# Patient Record
Sex: FEMALE | Race: WHITE | Employment: UNEMPLOYED | URBAN - METROPOLITAN AREA
[De-identification: names, ages, dates, MRNs, and addresses within clinical notes are randomized per-mention and may not be internally consistent; named-entity substitution may affect disease eponyms.]

---

## 2018-03-01 PROBLEM — F32.A MILD DEPRESSION: Status: ACTIVE | Noted: 2018-03-01

## 2018-03-01 PROBLEM — E66.01 OBESITY, MORBID (HCC): Status: ACTIVE | Noted: 2018-03-01

## 2018-03-13 ENCOUNTER — HOSPITAL ENCOUNTER (OUTPATIENT)
Dept: MAMMOGRAPHY | Age: 53
Discharge: HOME | End: 2018-03-13
Attending: FAMILY MEDICINE
Payer: COMMERCIAL

## 2018-03-13 DIAGNOSIS — Z12.39 BREAST SCREENING: ICD-10-CM

## 2018-03-13 PROCEDURE — 77067 SCR MAMMO BI INCL CAD: CPT

## 2018-04-05 PROBLEM — Z12.11 ENCOUNTER FOR SCREENING COLONOSCOPY: Status: ACTIVE | Noted: 2018-04-05

## 2018-04-29 ENCOUNTER — ANESTHESIA EVENT (OUTPATIENT)
Dept: ENDOSCOPY | Age: 53
End: 2018-04-29
Payer: COMMERCIAL

## 2018-04-29 RX ORDER — SODIUM CHLORIDE, SODIUM LACTATE, POTASSIUM CHLORIDE, CALCIUM CHLORIDE 600; 310; 30; 20 MG/100ML; MG/100ML; MG/100ML; MG/100ML
100 INJECTION, SOLUTION INTRAVENOUS CONTINUOUS
Status: CANCELLED | OUTPATIENT
Start: 2018-04-29

## 2018-04-29 RX ORDER — SODIUM CHLORIDE 0.9 % (FLUSH) 0.9 %
5-10 SYRINGE (ML) INJECTION AS NEEDED
Status: CANCELLED | OUTPATIENT
Start: 2018-04-29

## 2018-04-30 ENCOUNTER — HOSPITAL ENCOUNTER (OUTPATIENT)
Age: 53
Setting detail: OUTPATIENT SURGERY
Discharge: HOME | End: 2018-04-30
Attending: SURGERY | Admitting: SURGERY
Payer: COMMERCIAL

## 2018-04-30 ENCOUNTER — ANESTHESIA (OUTPATIENT)
Dept: ENDOSCOPY | Age: 53
End: 2018-04-30
Payer: COMMERCIAL

## 2018-04-30 VITALS
HEIGHT: 67 IN | HEART RATE: 57 BPM | SYSTOLIC BLOOD PRESSURE: 178 MMHG | BODY MASS INDEX: 39.24 KG/M2 | WEIGHT: 250 LBS | TEMPERATURE: 98.1 F | RESPIRATION RATE: 14 BRPM | DIASTOLIC BLOOD PRESSURE: 84 MMHG | OXYGEN SATURATION: 95 %

## 2018-04-30 PROCEDURE — 74011000250 HC RX REV CODE- 250

## 2018-04-30 PROCEDURE — 76060000032 HC ANESTHESIA 0.5 TO 1 HR: Performed by: SURGERY

## 2018-04-30 PROCEDURE — 74011250637 HC RX REV CODE- 250/637: Performed by: SURGERY

## 2018-04-30 PROCEDURE — 74011250636 HC RX REV CODE- 250/636

## 2018-04-30 PROCEDURE — 76040000025: Performed by: SURGERY

## 2018-04-30 PROCEDURE — 74011250636 HC RX REV CODE- 250/636: Performed by: ANESTHESIOLOGY

## 2018-04-30 PROCEDURE — 74011250636 HC RX REV CODE- 250/636: Performed by: SURGERY

## 2018-04-30 RX ORDER — LIDOCAINE HYDROCHLORIDE 20 MG/ML
INJECTION, SOLUTION EPIDURAL; INFILTRATION; INTRACAUDAL; PERINEURAL AS NEEDED
Status: DISCONTINUED | OUTPATIENT
Start: 2018-04-30 | End: 2018-04-30 | Stop reason: HOSPADM

## 2018-04-30 RX ORDER — MIDAZOLAM HYDROCHLORIDE 1 MG/ML
.25-5 INJECTION, SOLUTION INTRAMUSCULAR; INTRAVENOUS
Status: DISCONTINUED | OUTPATIENT
Start: 2018-04-30 | End: 2018-04-30 | Stop reason: HOSPADM

## 2018-04-30 RX ORDER — PROPOFOL 10 MG/ML
INJECTION, EMULSION INTRAVENOUS AS NEEDED
Status: DISCONTINUED | OUTPATIENT
Start: 2018-04-30 | End: 2018-04-30 | Stop reason: HOSPADM

## 2018-04-30 RX ORDER — PROPOFOL 10 MG/ML
INJECTION, EMULSION INTRAVENOUS
Status: DISCONTINUED | OUTPATIENT
Start: 2018-04-30 | End: 2018-04-30 | Stop reason: HOSPADM

## 2018-04-30 RX ORDER — SODIUM CHLORIDE, SODIUM LACTATE, POTASSIUM CHLORIDE, CALCIUM CHLORIDE 600; 310; 30; 20 MG/100ML; MG/100ML; MG/100ML; MG/100ML
100 INJECTION, SOLUTION INTRAVENOUS CONTINUOUS
Status: DISCONTINUED | OUTPATIENT
Start: 2018-04-30 | End: 2018-04-30 | Stop reason: HOSPADM

## 2018-04-30 RX ORDER — FLUMAZENIL 0.1 MG/ML
0.2 INJECTION INTRAVENOUS
Status: DISCONTINUED | OUTPATIENT
Start: 2018-04-30 | End: 2018-04-30 | Stop reason: HOSPADM

## 2018-04-30 RX ORDER — FENTANYL CITRATE 50 UG/ML
25-100 INJECTION, SOLUTION INTRAMUSCULAR; INTRAVENOUS
Status: DISCONTINUED | OUTPATIENT
Start: 2018-04-30 | End: 2018-04-30 | Stop reason: HOSPADM

## 2018-04-30 RX ORDER — NALOXONE HYDROCHLORIDE 0.4 MG/ML
0.4 INJECTION, SOLUTION INTRAMUSCULAR; INTRAVENOUS; SUBCUTANEOUS
Status: DISCONTINUED | OUTPATIENT
Start: 2018-04-30 | End: 2018-04-30 | Stop reason: HOSPADM

## 2018-04-30 RX ORDER — DEXTROMETHORPHAN/PSEUDOEPHED 2.5-7.5/.8
40 DROPS ORAL
Status: DISCONTINUED | OUTPATIENT
Start: 2018-04-30 | End: 2018-04-30 | Stop reason: HOSPADM

## 2018-04-30 RX ORDER — SODIUM CHLORIDE 9 MG/ML
1000 INJECTION, SOLUTION INTRAVENOUS CONTINUOUS
Status: DISCONTINUED | OUTPATIENT
Start: 2018-04-30 | End: 2018-04-30 | Stop reason: HOSPADM

## 2018-04-30 RX ADMIN — SODIUM CHLORIDE 1000 ML: 900 INJECTION, SOLUTION INTRAVENOUS at 08:53

## 2018-04-30 RX ADMIN — PROPOFOL 50 MG: 10 INJECTION, EMULSION INTRAVENOUS at 09:10

## 2018-04-30 RX ADMIN — PROPOFOL 300 MCG/KG/MIN: 10 INJECTION, EMULSION INTRAVENOUS at 09:08

## 2018-04-30 RX ADMIN — PROPOFOL 50 MG: 10 INJECTION, EMULSION INTRAVENOUS at 09:08

## 2018-04-30 RX ADMIN — LIDOCAINE HYDROCHLORIDE 50 MG: 20 INJECTION, SOLUTION EPIDURAL; INFILTRATION; INTRACAUDAL; PERINEURAL at 09:08

## 2018-04-30 RX ADMIN — SIMETHICONE 40 MG: 20 SUSPENSION/ DROPS ORAL at 10:03

## 2018-04-30 RX ADMIN — SODIUM CHLORIDE, SODIUM LACTATE, POTASSIUM CHLORIDE, AND CALCIUM CHLORIDE: 600; 310; 30; 20 INJECTION, SOLUTION INTRAVENOUS at 09:03

## 2018-04-30 NOTE — PROCEDURES
[address], [address] [facility]  [phone]  Colonoscopy Report  Safia Busch    Admit date: 2018    MRN: [id number]     : 1965     Age: 46 y.o.          2018 9:32 AM    Screening colonoscopy     Post Procedure Diagnosis: Normal colon    Indications:  Pt was sent by their primary care physician for screening colonoscopy. Risks and benefits of the procedure were discussed, and consent was obtained. Procedure:  Pt was brought back to the endoscopy suite, placed in left lateral decubitus position, and MAC was administered intravenously by anesthesiology and vital signs were monitored continuously during this time. Digital rectal exam was performed with no findings. Colonoscope was inserted into the anus, and advanced with insufflation around the entire length of the colon to the ileocecal valve. The valve was positively identified with the visual landmarks and palpation in the RLQ. The scope was then withdrawn slowly, inspecting the mucosa for any irregularities. Findings included normal colon. Bowel prep was good. Insufflation was suctioned out at the completion of the procedure, and the patient was transferred to the recovery area in stable condition. There were no complications. Pt tolerated the procedure well. Recommend repeat colonoscopy in 10 years. Specimens none    Estimated Blood Loss: 0-minimum.     Celeste Perez MD

## 2018-04-30 NOTE — H&P
Mar Stockton T Samples    4/30/2018      Subjective:     Patient is a 46 y.o. female who was sent by their primary care physician for screening colonoscopy. Pt denies any symptoms of abdominal pain, change in bowel habits, blood per rectum, unexplained weight loss, or other symptoms that would be of concern for colon cancer. Patient Active Problem List    Diagnosis Date Noted    Encounter for screening colonoscopy 04/05/2018    Obesity, morbid ([address] 03/01/2018    Mild depression ([facility] [address] 03/01/2018    Hypertension     GERD (gastroesophageal reflux disease)     Depression     Hypercholesterolemia     Insomnia 05/05/2015    Environmental and seasonal allergies 05/05/2015    Obesity 05/05/2015    Anxiety 05/05/2015     Past Medical History:   Diagnosis Date    Anxiety     Depression     Environmental and seasonal allergies     GERD (gastroesophageal reflux disease)     Hypercholesterolemia     Hypertension     Poor historian       Past Surgical History:   Procedure Laterality Date    HX APPENDECTOMY      HX BACK SURGERY      HX CHOLECYSTECTOMY      HX HYSTERECTOMY      HX ORTHOPAEDIC      bilateral knee    HX TONSIL AND ADENOIDECTOMY        Prior to Admission Medications   Prescriptions Last Dose Informant Patient Reported? Taking? FLUoxetine (PROZAC) 20 mg capsule 4/29/2018 at Unknown time  Yes Yes   Sig: Take  by mouth daily. FLUoxetine (PROZAC) 40 mg capsule 4/29/2018 at Unknown time  Yes Yes   Sig: Take 40 mg by mouth two (2) times a day. busPIRone (BUSPAR) 15 mg tablet 4/29/2018 at Unknown time  Yes Yes   Sig: Take 1 Tab by mouth three (3) times daily as needed. busPIRone (BUSPAR) 30 mg tablet 4/29/2018 at Unknown time  Yes Yes   Sig: Take 1 Tab by mouth two (2) times daily as needed. calcium-cholecalciferol, d3, (CALCIUM 600 + D) 600-125 mg-unit tab 4/29/2018 at Unknown time  Yes Yes   Sig: Take  by mouth.  Indications: OTC   clonazePAM (KLONOPIN) 1 mg tablet   Yes No   Sig: Take  by mouth two (2) times a day. 1 tab at bedtime and 1/2 tab during the day prn   loratadine-pseudoephedrine (CLARITIN-D 12 HOUR) 5-120 mg per tablet 4/29/2018 at Unknown time  Yes Yes   Sig: Take 1 Tab by mouth two (2) times a day. Indications: OTC   propranolol (INDERAL) 20 mg tablet 4/29/2018 at Unknown time  Yes Yes   ranitidine (ZANTAC) 75 mg tablet 4/29/2018 at Unknown time  Yes Yes   Sig: Take 75 mg by mouth two (2) times a day. temazepam (RESTORIL) 15 mg capsule 4/29/2018 at Unknown time  Yes Yes   Sig: Take  by mouth nightly as needed for Sleep.   traZODone (DESYREL) 50 mg tablet 4/29/2018 at Unknown time  Yes Yes   Sig: Take 1 Tab by mouth two (2) times a day. Facility-Administered Medications: None     No Known Allergies   Social History   Substance Use Topics    Smoking status: Never Smoker    Smokeless tobacco: Never Used    Alcohol use No      Social History     Social History Narrative     Family History   Problem Relation Age of Onset    Hypertension Mother     Elevated Lipids Mother     Muscular dystrophy Maternal Aunt     Cancer Maternal Aunt     Breast Cancer Maternal Aunt 46    Muscular dystrophy Maternal Uncle     Diabetes Maternal Grandmother     Stroke Maternal Grandmother     Drug Abuse Brother         No current facility-administered medications for this encounter. Review of Systems  A comprehensive review of systems was negative except for that written in the HPI.     Objective:     Vitals:    04/30/18 0824 04/30/18 0844   BP:  141/79   Pulse:  70   Resp:  18   Temp: 98.1 °F (36.7 °C)    SpO2:  94%   Weight: 250 lb (113.4 kg)    Height: 5' 7\" (1.702 m)      PHYSICAL EXAM     Gen- the patient is well developed and in no acute distress  HEENT- PERRL, EOMI, no scleral icterus       nose without alar flaring or epistaxis                  oral muscosa moist without cyanosis  Neck- no JVD or retractions  Lungs-clear  Heart- RRR without M,G,R  Abd- soft and non-tender; with positive bowel sounds. Ext- warm without cyanosis. There is no lower leg edema. Skin- no jaundice or rashes, no wounds   Neuro- alert and oriented x 3. No gross sensorimotor deficits are present. Plan:     Age appropriate screening colonoscopy. I have discussed the risks, benefits and alternatives of surgery. Pt understands and wishes to proceed.   Consent obtained           Annita Mayen MD

## 2018-04-30 NOTE — ANESTHESIA PREPROCEDURE EVALUATION
Anesthetic History   No history of anesthetic complications            Review of Systems / Medical History  Patient summary reviewed and pertinent labs reviewed    Pulmonary  Within defined limits                 Neuro/Psych   Within defined limits           Cardiovascular    Hypertension          Hyperlipidemia         GI/Hepatic/Renal     GERD           Endo/Other        Morbid obesity     Other Findings              Physical Exam    Airway  Mallampati: II  TM Distance: 4 - 6 cm  Neck ROM: normal range of motion   Mouth opening: Normal     Cardiovascular    Rhythm: regular  Rate: normal      Pertinent negatives: No murmur, JVD and peripheral edema   Dental  No notable dental hx       Pulmonary  Breath sounds clear to auscultation               Abdominal         Other Findings            Anesthetic Plan    ASA: 3  Anesthesia type: total IV anesthesia          Induction: Intravenous

## 2018-04-30 NOTE — DISCHARGE INSTRUCTIONS
Gastrointestinal Colonoscopy/Flexible Sigmoidoscopy - Lower Exam Discharge Instructions  1. Call Dr. Marty Pelletier at [phone] for any problems or questions. 2. Contact the doctors office for follow up appointment as directed  3. Medication may cause drowsiness for several hours, therefore, do not drive or operate machinery for remainder of the day. 4. No alcohol today. 5. Ordinarily, you may resume regular diet and activity after exam unless otherwise specified by your physician. 6. Because of air put into your colon during exam, you may experience some abdominal distension, relieved by the passage of gas, for several hours. 7. Contact your physician if you have any of the following:  a. Excessive amount of bleeding - large amount when having a bowel movement. Occasional specks of blood with bowel movement would not be unusual.  b. Severe abdominal pain  c. Fever or Chills  Any additional instructions:  Repeat colonoscopy in 10 years. Instructions given to Gema STEINER Jo-Ann and other family members.

## 2018-04-30 NOTE — ANESTHESIA POSTPROCEDURE EVALUATION
Post-Anesthesia Evaluation and Assessment    Patient: No Busch MRN: [id number]  SSN: [id number]    YOB: 1965  Age: 46 y.o. Sex: female       Cardiovascular Function/Vital Signs  Visit Vitals    /68    Pulse 64    Temp 36.7 °C (98.1 °F)    Resp 16    Ht 5' 7\" (1.702 m)    Wt 113.4 kg (250 lb)    SpO2 93%    Breastfeeding No    BMI 39.16 kg/m2       Patient is status post total IV anesthesia anesthesia for Procedure(s):  COLONOSCOPY. Nausea/Vomiting: None    Postoperative hydration reviewed and adequate. Pain:  Pain Scale 1: Visual (04/30/18 0936)  Pain Intensity 1: 0 (04/30/18 0936)   Managed    Neurological Status: At baseline    Mental Status and Level of Consciousness: Arousable    Pulmonary Status:   O2 Device: Nasal cannula (04/30/18 0936)   Adequate oxygenation and airway patent    Complications related to anesthesia: None    Post-anesthesia assessment completed.  No concerns    Signed By: Karuna Mcclelland MD     April 30, 2018

## 2018-06-17 ENCOUNTER — HOSPITAL ENCOUNTER (EMERGENCY)
Age: 53
Discharge: HOME | End: 2018-06-17
Attending: EMERGENCY MEDICINE
Payer: COMMERCIAL

## 2018-06-17 ENCOUNTER — APPOINTMENT (OUTPATIENT)
Dept: GENERAL RADIOLOGY | Age: 53
End: 2018-06-17
Attending: EMERGENCY MEDICINE
Payer: COMMERCIAL

## 2018-06-17 VITALS
WEIGHT: 240 LBS | DIASTOLIC BLOOD PRESSURE: 85 MMHG | RESPIRATION RATE: 18 BRPM | OXYGEN SATURATION: 94 % | HEART RATE: 55 BPM | HEIGHT: 67 IN | SYSTOLIC BLOOD PRESSURE: 133 MMHG | TEMPERATURE: 98.5 F | BODY MASS INDEX: 37.67 KG/M2

## 2018-06-17 DIAGNOSIS — S93.402A SPRAIN OF LEFT ANKLE, UNSPECIFIED LIGAMENT, INITIAL ENCOUNTER: Primary | ICD-10-CM

## 2018-06-17 PROCEDURE — 99283 EMERGENCY DEPT VISIT LOW MDM: CPT | Performed by: EMERGENCY MEDICINE

## 2018-06-17 PROCEDURE — 73610 X-RAY EXAM OF ANKLE: CPT

## 2018-06-18 NOTE — ED NOTES
I have reviewed discharge instructions with the patient. The patient verbalized understanding. Patient left ED via Discharge Method: wheelchair to Home with . Opportunity for questions and clarification provided. Patient given 0 scripts. To continue your aftercare when you leave the hospital, you may receive an automated call from our care team to check in on how you are doing. This is a free service and part of our promise to provide the best care and service to meet your aftercare needs.  If you have questions, or wish to unsubscribe from this service please call [phone]. Thank you for Choosing our [facility].

## 2018-06-18 NOTE — ED PROVIDER NOTES
HPI Comments: Patient has a history of hypertension, depression, anxiety she comes to the ER today with her  after an injury to the left ankle. [place] and fell twisting and landing awkwardly on her left ankle. There was no head injury or loss of consciousness. It was painful to bear weight. Family helped her up to the car. Patient is a 46 y.o. female presenting with ankle pain. The history is provided by the patient. Ankle Pain    This is a new problem. The current episode started 1 to 2 hours ago. The problem occurs constantly. The problem has not changed since onset. The pain is present in the left ankle. The quality of the pain is described as aching. The pain is moderate. Pertinent negatives include no numbness, full range of motion, no back pain and no neck pain. The symptoms are aggravated by standing. She has tried nothing for the symptoms. There has been a history of trauma.         Past Medical History:   Diagnosis Date    Anxiety     Depression     Environmental and seasonal allergies     GERD (gastroesophageal reflux disease)     Hypercholesterolemia     Hypertension     Poor historian        Past Surgical History:   Procedure Laterality Date    COLONOSCOPY N/A 4/30/2018    COLONOSCOPY performed by Jefferson Orellana MD at [facility] ENDOSCOPY    HX APPENDECTOMY      HX BACK SURGERY      HX CHOLECYSTECTOMY      HX HYSTERECTOMY      HX ORTHOPAEDIC      bilateral knee    HX TONSIL AND ADENOIDECTOMY           Family History:   Problem Relation Age of Onset    Hypertension Mother    [facility] Elevated Lipids Mother     Muscular dystrophy Maternal Aunt     Cancer Maternal Aunt     Breast Cancer Maternal Aunt 46    Muscular dystrophy Maternal Uncle     Diabetes Maternal Grandmother     Stroke Maternal Grandmother     Drug Abuse Brother        Social History     Social History    Marital status:      Spouse name: N/A    Number of children: N/A    Years of education: N/A     Occupational History    Not on file. Social History Main Topics    Smoking status: Never Smoker    Smokeless tobacco: Never Used    Alcohol use No    Drug use: No    Sexual activity: Not on file     Other Topics Concern    Not on file     Social History Narrative         ALLERGIES: Review of patient's allergies indicates no known allergies. Review of Systems   Constitutional: Negative. HENT: Negative. Eyes: Negative. Respiratory: Negative. Cardiovascular: Negative. Gastrointestinal: Negative. Endocrine: Negative. Genitourinary: Negative. Musculoskeletal: Negative for back pain and neck pain. Skin: Negative. Neurological: Negative for numbness. Vitals:    06/17/18 2016   BP: 136/75   Pulse: (!) 56   Resp: 18   Temp: 98 °F (36.7 °C)   SpO2: 95%   Weight: 108.9 kg (240 lb)   Height: 5' 7\" (1.702 m)            Physical Exam   Constitutional: She is oriented to person, place, and time. She appears well-developed and well-nourished. HENT:   Head: Normocephalic and atraumatic. Neck: Normal range of motion. no cervical spine tenderness   Cardiovascular: Intact distal pulses. Musculoskeletal: She exhibits tenderness. She exhibits no deformity. Tender to the lateral aspect of the left ankle   Neurological: She is alert and oriented to person, place, and time. She has normal strength. No cranial nerve deficit or sensory deficit. GCS eye subscore is 4. GCS verbal subscore is 5. GCS motor subscore is 6. Skin: Skin is warm and dry. No rash noted. Nursing note and vitals reviewed.        MDM  Number of Diagnoses or Management Options  Diagnosis management comments: Differential diagnoses includes fall, contusion, fracture, dislocation       Amount and/or Complexity of Data Reviewed  Tests in the radiology section of CPT®: ordered and reviewed  Review and summarize past medical records: yes  Independent visualization of images, tracings, or specimens: yes    Risk of Complications, Morbidity, and/or Mortality  Presenting problems: moderate  Diagnostic procedures: low  Management options: low    Patient Progress  Patient progress: stable        ED Course   9:09 PM  X-rays of the left ankle are negative for fracture dislocation. Voice dictation software was used during the making of this note. This software is not perfect and grammatical and other typographical errors may be present. This note has been proofread, but may still contain errors.   Meg Morales MD; 6/17/2018 @9:10 PM   ===================================================================        Procedures

## 2018-06-18 NOTE — DISCHARGE INSTRUCTIONS
Ankle Sprain: Care Instructions  Your Care Instructions    An ankle sprain can happen when you twist your ankle. The ligaments that support the ankle can get stretched and torn. Often the ankle is swollen and painful. Ankle sprains may take from several weeks to several months to heal. Usually, the more pain and swelling you have, the more severe your ankle sprain is and the longer it will take to heal. You can heal faster and regain strength in your ankle with good home treatment. It is very important to give your ankle time to heal completely, so that you do not easily hurt your ankle again. Follow-up care is a key part of your treatment and safety. Be sure to make and go to all appointments, and call your doctor if you are having problems. It's also a good idea to know your test results and keep a list of the medicines you take. How can you care for yourself at home? · Prop up your foot on pillows as much as possible for the next 3 days. Try to keep your ankle above the level of your heart. This will help reduce the swelling. · Follow your doctor's directions for wearing a splint or elastic bandage. Wrapping the ankle may help reduce or prevent swelling. · Your doctor may give you a splint, a brace, an air stirrup, or another form of ankle support to protect your ankle until it is healed. Wear it as directed while your ankle is healing. Do not remove it unless your doctor tells you to. After your ankle has healed, ask your doctor whether you should wear the brace when you exercise. · Put ice or cold packs on your injured ankle for 10 to 20 minutes at a time. Try to do this every 1 to 2 hours for the next 3 days (when you are awake) or until the swelling goes down. Put a thin cloth between the ice and your skin. · You may need to use crutches until you can walk without pain. If you do use crutches, try to bear some weight on your injured ankle if you can do so without pain.  This helps the ankle heal.  · Take pain medicines exactly as directed. ¨ If the doctor gave you a prescription medicine for pain, take it as prescribed. ¨ If you are not taking a prescription pain medicine, ask your doctor if you can take an over-the-counter medicine. · If you have been given ankle exercises to do at home, do them exactly as instructed. These can promote healing and help prevent lasting weakness. When should you call for help? Call your doctor now or seek immediate medical care if:  ? · Your pain is getting worse. ? · Your swelling is getting worse. ? · Your splint feels too tight or you are unable to loosen it. ? Watch closely for changes in your health, and be sure to contact your doctor if:  ? · You are not getting better after 1 week. Where can you learn more? Go to http://alpa-bebeto.info/. Enter Z342 in the search box to learn more about \"Ankle Sprain: Care Instructions. \"  Current as of: March 21, 2017  Content Version: 11.4  © 3117-3776 [employer]. Care instructions adapted under license by [employer] (which disclaims liability or warranty for this information). If you have questions about a medical condition or this instruction, always ask your healthcare professional. [address] any warranty or liability for your use of this information.

## 2019-09-24 PROBLEM — Z12.11 ENCOUNTER FOR SCREENING COLONOSCOPY: Status: RESOLVED | Noted: 2018-04-05 | Resolved: 2019-09-24

## 2019-10-01 ENCOUNTER — HOSPITAL ENCOUNTER (OUTPATIENT)
Dept: GENERAL RADIOLOGY | Age: 54
Discharge: HOME | End: 2019-10-01

## 2019-10-01 DIAGNOSIS — M53.3 SACRAL BACK PAIN: ICD-10-CM

## 2019-10-01 DIAGNOSIS — M54.50 ACUTE BILATERAL LOW BACK PAIN, UNSPECIFIED WHETHER SCIATICA PRESENT: ICD-10-CM

## 2021-08-23 ENCOUNTER — HOSPITAL ENCOUNTER (OUTPATIENT)
Dept: MAMMOGRAPHY | Age: 56
Discharge: HOME | End: 2021-08-23
Attending: FAMILY MEDICINE
Payer: COMMERCIAL

## 2021-08-23 DIAGNOSIS — Z12.31 SCREENING MAMMOGRAM, ENCOUNTER FOR: ICD-10-CM

## 2021-08-23 PROCEDURE — 77067 SCR MAMMO BI INCL CAD: CPT

## 2021-12-05 ENCOUNTER — HOSPITAL ENCOUNTER (EMERGENCY)
Age: 56
Discharge: HOME | End: 2021-12-05
Attending: STUDENT IN AN ORGANIZED HEALTH CARE EDUCATION/TRAINING PROGRAM
Payer: COMMERCIAL

## 2021-12-05 ENCOUNTER — APPOINTMENT (OUTPATIENT)
Dept: GENERAL RADIOLOGY | Age: 56
End: 2021-12-05
Attending: STUDENT IN AN ORGANIZED HEALTH CARE EDUCATION/TRAINING PROGRAM
Payer: COMMERCIAL

## 2021-12-05 VITALS
OXYGEN SATURATION: 97 % | HEART RATE: 71 BPM | SYSTOLIC BLOOD PRESSURE: 130 MMHG | BODY MASS INDEX: 39.4 KG/M2 | RESPIRATION RATE: 18 BRPM | WEIGHT: 260 LBS | TEMPERATURE: 98 F | DIASTOLIC BLOOD PRESSURE: 87 MMHG | HEIGHT: 68 IN
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
## 2021-12-05 DIAGNOSIS — S22.41XA CLOSED FRACTURE OF MULTIPLE RIBS OF RIGHT SIDE, INITIAL ENCOUNTER: ICD-10-CM

## 2021-12-05 DIAGNOSIS — M25.531 RIGHT WRIST PAIN: ICD-10-CM

## 2021-12-05 PROCEDURE — 71101 X-RAY EXAM UNILAT RIBS/CHEST: CPT

## 2021-12-05 PROCEDURE — 73610 X-RAY EXAM OF ANKLE: CPT

## 2021-12-05 PROCEDURE — 75810000053 HC SPLINT APPLICATION

## 2021-12-05 PROCEDURE — 73110 X-RAY EXAM OF WRIST: CPT

## 2021-12-05 PROCEDURE — 74011250637 HC RX REV CODE- 250/637: Performed by: NURSE PRACTITIONER

## 2021-12-05 PROCEDURE — 99283 EMERGENCY DEPT VISIT LOW MDM: CPT

## 2021-12-05 RX ORDER — HYDROCODONE BITARTRATE AND ACETAMINOPHEN 5; 325 MG/1; MG/1
1 TABLET ORAL
Qty: 10 TABLET | Refills: 0 | Status: SHIPPED | OUTPATIENT
Start: 2021-12-05 | End: 2021-12-08

## 2021-12-05 RX ORDER — NALOXONE HYDROCHLORIDE 4 MG/.1ML
SPRAY NASAL
Qty: 2 EACH | Refills: 0 | Status: SHIPPED | OUTPATIENT
Start: 2021-12-05

## 2021-12-05 RX ORDER — HYDROCODONE BITARTRATE AND ACETAMINOPHEN 5; 325 MG/1; MG/1
1 TABLET ORAL ONCE
Status: COMPLETED | OUTPATIENT
Start: 2021-12-05 | End: 2021-12-05

## 2021-12-05 RX ADMIN — HYDROCODONE BITARTRATE AND ACETAMINOPHEN 1 TABLET: 5; 325 TABLET ORAL at 18:34

## 2021-12-05 NOTE — ED TRIAGE NOTES
Patient ambulatory to triage with mask in place. Patient reports she tripped and fell. Pt reports right wrist, rib and right foot pain.

## 2021-12-05 NOTE — ED PROVIDER NOTES
59-year-old female presents emergency department today after a fall. She reports that she was trying to lock her door with one hand and had a drink in the other hand and went to turn and fell on her right side. She denies any dizziness, loss of consciousness, head injury, neck pain, back pain, vision changes, or hearing changes. She reports pain in her right wrist, right ribs, and right ankle. She denies any treatment prior to arrival.  Pain is worse with movement or palpation. Pain is improved with rest.    The history is provided by the patient.    Arm Pain    Fall         Past Medical History:   Diagnosis Date    Anxiety     Depression     Environmental and seasonal allergies     GERD (gastroesophageal reflux disease)     Hypercholesterolemia     Hypertension     Poor historian        Past Surgical History:   Procedure Laterality Date    COLONOSCOPY N/A 4/30/2018    COLONOSCOPY performed by Charl Riedel, MD at [facility] ENDOSCOPY    HX APPENDECTOMY      HX BACK SURGERY      HX CHOLECYSTECTOMY      HX HYSTERECTOMY      HX ORTHOPAEDIC      bilateral knee    HX TONSIL AND ADENOIDECTOMY           Family History:   Problem Relation Age of Onset    Hypertension Mother    Bernestine Davonte Elevated Lipids Mother     Muscular dystrophy Maternal Aunt     Cancer Maternal Aunt     Breast Cancer Maternal Aunt 46    Muscular dystrophy Maternal Uncle     Diabetes Maternal Grandmother     Stroke Maternal Grandmother     Drug Abuse Brother        Social History     Socioeconomic History    Marital status:      Spouse name: Not on file    Number of children: Not on file    Years of education: Not on file    Highest education level: Not on file   Occupational History    Not on file   Tobacco Use    Smoking status: Never Smoker    Smokeless tobacco: Never Used   Vaping Use    Vaping Use: Never used   Substance and Sexual Activity    Alcohol use: No    Drug use: No    Sexual activity: Not on file   Other Topics Concern    Not on file   Social History Narrative    Not on file     Social Determinants of Health     Financial Resource Strain:     Difficulty of Paying Living Expenses: Not on file   Food Insecurity:     Worried About Running Out of Food in the Last Year: Not on file    Hernandez of Food in the Last Year: Not on file   Transportation Needs:     Lack of Transportation (Medical): Not on file    Lack of Transportation (Non-Medical): Not on file   Physical Activity:     Days of Exercise per Week: Not on file    Minutes of Exercise per Session: Not on file   Stress:     Feeling of Stress : Not on file   Social Connections:     Frequency of Communication with Friends and Family: Not on file    Frequency of Social Gatherings with Friends and Family: Not on file    Attends Gnosticist Services: Not on file    Active Member of [address] [place] [address] or Organizations: Not on file    Attends Club or Organization Meetings: Not on file    Marital Status: Not on file   Intimate Partner Violence:     Fear of Current or Ex-Partner: Not on file    Emotionally Abused: Not on file    Physically Abused: Not on file    Sexually Abused: Not on file   Housing Stability:     Unable to Pay for Housing in the Last Year: Not on file    Number of [place] in the Last Year: Not on file    Unstable Housing in the Last Year: Not on file         ALLERGIES: Patient has no known allergies. Review of Systems   Musculoskeletal: Positive for arthralgias. Negative for back pain and neck pain. Skin: Negative for wound. Neurological: Negative for dizziness, syncope and light-headedness. All other systems reviewed and are negative. Vitals:    12/05/21 1643 12/05/21 1943 12/05/21 1952   BP: (!) 137/92  130/87   Pulse: 75  71   Resp: 16  18   Temp: 97.8 °F (36.6 °C)  98 °F (36.7 °C)   SpO2: 97% 97%    Weight: 117.9 kg (260 lb)     Height: 5' 8\" (1.727 m)              Physical Exam  Vitals and nursing note reviewed. Constitutional:       General: She is not in acute distress. Appearance: Normal appearance. She is not ill-appearing, toxic-appearing or diaphoretic. HENT:      Head: Normocephalic and atraumatic. Right Ear: External ear normal.      Left Ear: External ear normal.      Mouth/Throat:      Mouth: Mucous membranes are moist.      Pharynx: Oropharynx is clear. Eyes:      General: No scleral icterus. Extraocular Movements: Extraocular movements intact. Conjunctiva/sclera: Conjunctivae normal.   Cardiovascular:      Rate and Rhythm: Normal rate. Pulses: Normal pulses. Radial pulses are 2+ on the right side and 2+ on the left side. Dorsalis pedis pulses are 2+ on the right side. Posterior tibial pulses are 2+ on the right side. Pulmonary:      Effort: Pulmonary effort is normal. No respiratory distress. Chest:      Chest wall: Tenderness present. No lacerations, deformity, swelling or crepitus. Abdominal:      General: Abdomen is flat. There is no distension. Musculoskeletal:      Right forearm: Normal.      Left forearm: Normal.      Right wrist: Swelling, tenderness and bony tenderness present. No deformity or snuff box tenderness. Decreased range of motion. Normal pulse. Left wrist: Normal.      Right hand: Normal. No swelling, deformity or lacerations. Normal range of motion. Normal sensation. Normal capillary refill. Normal pulse. Left hand: Normal.      Cervical back: Normal range of motion and neck supple. No rigidity. Right lower leg: No edema. Left lower leg: No edema. Right ankle: No swelling or deformity. Tenderness present over the lateral malleolus. Normal range of motion. Right foot: Normal. No swelling or deformity. Comments: Swelling to radial side of right wrist.  Range of motion of right wrist is decreased secondary to pain. Tenderness to the area in. No obvious deformity.   Patient has good, equal radial pulses bilaterally. Capillary refill is less than 2 seconds on digits of right hand. Sensation is intact to right hand. Tenderness with palpation to lateral malleolus of right ankle. Patient has full range of motion of ankle. No swelling or deformity noted. Skin:     General: Skin is warm and dry. Capillary Refill: Capillary refill takes less than 2 seconds. Neurological:      General: No focal deficit present. Mental Status: She is alert and oriented to person, place, and time. Psychiatric:         Mood and Affect: Mood normal.         Behavior: Behavior normal.         Thought Content: Thought content normal.         Judgment: Judgment normal.          MDM  Number of Diagnoses or Management Options  Closed fracture of multiple ribs of right side, initial encounter  Fall, initial encounter  Right wrist pain  Diagnosis management comments: 80-year-old female presents emergency department today after a mechanical fall. Right sixth and seventh rib fractures noted on x-ray. Lungs are clear. Respirations are even and unlabored. Patient with no difficulty breathing. Results discussed with the patient and her spouse. Supportive treatment discussed for the rib fractures. She also has swelling and tenderness to her right wrist.  She does have a history of fracture in this wrist that did require surgery. Radiologist states that a slight impacted distal radius fracture is possible but old injury could also appear similar. Due to patient's amount of pain with palpation of her wrist along with the swelling noted to the radial side of her wrist, patient placed in volar splint. Neurovascular exam appears normal prior to application of splint and was reexamined after application. Patient continues to have good capillary refill and sensation is intact to her fingers. She does have improvement in pain after application of splint. Right ankle x-ray is negative.   She exhibits full range of motion of her ankle without difficulty. Patient also provided with a incentive spirometer in the emergency department today and educated on the use. Refer to orthopedics for follow-up of her wrist. I have discussed the results of all labs, procedures, radiographs, and/or treatments with the patient and available family members. Kamilla Cramer is agreed upon by the patient and the patient is ready for discharge.  Questions about treatment in the ED and differential diagnosis of presenting condition were answered. Zuri Negrete was given verbal discharge instructions including, but not limited to, importance of returning to the emergency department for any concern of worsening or continued symptoms.  Instructions were given to follow up with a primary care provider or specialist within 1-2 days. Vanice Paloma effects of medications, if prescribed, were discussed and patient was advised to refrain from significant physical activity until followed up by primary care physician and to not drive or operate heavy machinery after taking any sedating substances.      Juvenal Smith NP; 12/5/2021 @10:17 PM Voice dictation software was used during the making of  this note. This software is not perfect and grammatical and other typographical errors  may be present. This note has not been proofread for errors. Amount and/or Complexity of Data Reviewed  Tests in the radiology section of CPT®: reviewed      ED Course as of 12/05/21 2217   Sun Dec 05, 2021   1743 XR RIBS RT W PA CXR MIN 3 V  FINDINGS: Lungs are clear. There is no pneumothorax, infiltrate, or effusion. Heart size is normal.     There are nondisplaced fractures involving the posterior lateral right sixth and  seventh ribs.     IMPRESSION  Nondisplaced fractures right sixth and seventh ribs. No pneumothorax  or pleural effusion.  [BC]   1758 XR WRIST RT AP/LAT/OBL MIN 3V  FINDINGS: Irregular sclerotic line noted through the distal radial metadiaphysis  on the AP and oblique views with slight cortical irregularity dorsally and  slight cortical angulation on the volar aspect of the distal radius on the  lateral view. Slight impacted distal radial fracture is possible. Old injury  could appear similar. No other fracture or dislocation. No significant soft  tissue swelling is appreciated. Correlate with area of patient's pain.     IMPRESSION  Negative right wrist [BC]   1758 XR ANKLE RT MIN 3 V  FINDINGS: There is no evidence of fracture or other acute bony abnormality. There are no bony lesions. The ankle mortise is intact. Probable old fracture  fragment inferior to the lateral malleolus. This appears well-corticated.     IMPRESSION  Negative right ankle [BC]      ED Course User Index  [BC] Larissa Sharif NP       Splint, Volar    Date/Time: 12/5/2021 10:16 PM  Performed by: Larissa Sharif NP  Authorized by: Larissa Sharif NP     Consent:     Consent obtained:  Verbal    Consent given by:  Patient and spouse    Risks discussed:  Discoloration, numbness, pain and swelling  Pre-procedure details:     Sensation:  Normal    Skin color:  Appropriate for ethnicity  Procedure details:     Laterality:  Right    Location:  Wrist    Wrist:  R wrist    Splint type:  Volar short arm    Supplies:  Elastic bandage, Ortho-Glass and cotton padding  Post-procedure details:     Pain:  Improved    Sensation:  Normal    Skin color:  Appropriate for ethnicity    Patient tolerance of procedure:   Tolerated well, no immediate complications

## 2021-12-06 NOTE — ED NOTES
I have reviewed discharge instructions with the patient. The patient and spouse verbalized understanding. Patient left ED via Discharge Method: wheelchair to Home with . Opportunity for questions and clarification provided. Patient given 2 scripts. To continue your aftercare when you leave the hospital, you may receive an automated call from our care team to check in on how you are doing. This is a free service and part of our promise to provide the best care and service to meet your aftercare needs.  If you have questions, or wish to unsubscribe from this service please call [phone]. Thank you for Choosing our [facility] Emergency Department.

## 2021-12-06 NOTE — DISCHARGE INSTRUCTIONS
Take medication as prescribed. Apply ice to painful areas for 10 to 20 minutes every 1-2 hours to help alleviate pain. Do not get splint wet. Keep splint in place until seen by orthopedics. Return to the emergency department for any new, worsening, or concerning symptoms.

## 2022-03-18 PROBLEM — F32.A MILD DEPRESSION: Status: ACTIVE | Noted: 2018-03-01

## 2022-03-20 PROBLEM — E66.01 OBESITY, MORBID (HCC): Status: ACTIVE | Noted: 2018-03-01

## 2022-05-05 ENCOUNTER — HOSPITAL ENCOUNTER (OUTPATIENT)
Dept: LAB | Age: 57
Discharge: HOME | End: 2022-05-05
Payer: COMMERCIAL

## 2022-05-05 LAB
ALBUMIN SERPL-MCNC: 3.5 G/DL (ref 3.5–5)
ALBUMIN/GLOB SERPL: 1 {RATIO} (ref 1.2–3.5)
ALP SERPL-CCNC: 88 U/L (ref 50–136)
ALT SERPL-CCNC: 35 U/L (ref 12–65)
ANION GAP SERPL CALC-SCNC: 4 MMOL/L (ref 7–16)
AST SERPL-CCNC: 24 U/L (ref 15–37)
BILIRUB SERPL-MCNC: 0.5 MG/DL (ref 0.2–1.1)
BUN SERPL-MCNC: 14 MG/DL (ref 6–23)
CALCIUM SERPL-MCNC: 9 MG/DL (ref 8.3–10.4)
CHLORIDE SERPL-SCNC: 109 MMOL/L (ref 98–107)
CO2 SERPL-SCNC: 29 MMOL/L (ref 21–32)
CREAT SERPL-MCNC: 0.9 MG/DL (ref 0.6–1)
GLOBULIN SER CALC-MCNC: 3.4 G/DL (ref 2.3–3.5)
GLUCOSE SERPL-MCNC: 110 MG/DL (ref 65–100)
POTASSIUM SERPL-SCNC: 3.9 MMOL/L (ref 3.5–5.1)
PROT SERPL-MCNC: 6.9 G/DL (ref 6.3–8.2)
SODIUM SERPL-SCNC: 142 MMOL/L (ref 136–145)
TSH SERPL DL<=0.005 MIU/L-ACNC: 1.06 UIU/ML (ref 0.36–3.74)

## 2022-05-05 PROCEDURE — 84443 ASSAY THYROID STIM HORMONE: CPT

## 2022-05-05 PROCEDURE — 36415 COLL VENOUS BLD VENIPUNCTURE: CPT

## 2022-05-05 PROCEDURE — 80053 COMPREHEN METABOLIC PANEL: CPT

## 2022-09-19 ENCOUNTER — OFFICE VISIT (OUTPATIENT)
Dept: NEUROLOGY | Age: 57
End: 2022-09-19
Payer: COMMERCIAL

## 2022-09-19 VITALS
BODY MASS INDEX: 42.36 KG/M2 | SYSTOLIC BLOOD PRESSURE: 160 MMHG | HEIGHT: 69 IN | DIASTOLIC BLOOD PRESSURE: 106 MMHG | WEIGHT: 286 LBS | HEART RATE: 76 BPM

## 2022-09-19 DIAGNOSIS — R41.3 MEMORY LOSS: Primary | ICD-10-CM

## 2022-09-19 DIAGNOSIS — I63.9 CEREBROVASCULAR ACCIDENT (CVA), UNSPECIFIED MECHANISM (HCC): ICD-10-CM

## 2022-09-19 PROCEDURE — 99204 OFFICE O/P NEW MOD 45 MIN: CPT | Performed by: PSYCHIATRY & NEUROLOGY

## 2022-09-19 ASSESSMENT — ENCOUNTER SYMPTOMS
RESPIRATORY NEGATIVE: 1
GASTROINTESTINAL NEGATIVE: 1
EYES NEGATIVE: 1

## 2022-09-19 NOTE — PROGRESS NOTES
[address], [place], [address]  Phone: [phone] Fax [phone]  Dr. Alexi Rosas      9/19/2022  Maricel Schuster     Patient is referred by the following provider for consultation regarding as below:       I reviewed the available records and notes and have examined patient with the following findings:     Chief Complaint:  Chief Complaint   Patient presents with    Other     Amnesia          HPI: This is a right handed 64 y.o.  female with her  here with her . The patient's  and the patient state that she has been having memory loss going about 6 years over the last year its been much worse due to her anxiety that was extremely elevated over the last 1 year. In fact some the prior notes state that she was crying through the evaluation and she cried through some of this evaluation and laughed at other points. She has short-term long-term memory anything asked of her she immediately looks at her  and then looks at me and states \"I do not know\". With every answer. But then she will move on and could visit give very specifics about certain things such as when she lost her job and took a new job and how she was incapable of remembering anything there but she was very specific about it and had actually quit before her boss came in. She is under psychiatric care and they have been working with her. She panics very easily and really turns to tears. She has a brother and father of which daughter had dementia 2 brothers and 1 sister again no dementia or memory problems or health issues. She has no lateralizing weakness. She has had anxiety for many many years. Just progressively getting worse. She has a psychiatrist been working with her for this issue and she is on [employer] and a few other products. There is no gait bowel or bladder changes no lateralizing weakness.     IMAGING REVIEW:  I REVIEWED PERTINENT  IMAGES AND REPORTS WITH THE PATIENT PERSONALLY, DIRECTLY AND FULLY.      Past Medical History:  Past Medical History:   Diagnosis Date    Anxiety     Depression     Environmental and seasonal allergies     GERD (gastroesophageal reflux disease)     Hypercholesterolemia     Hypertension     Poor historian        Past Surgical History:  Past Surgical History:   Procedure Laterality Date    APPENDECTOMY      BACK SURGERY      CHOLECYSTECTOMY      COLONOSCOPY N/A 4/30/2018    COLONOSCOPY performed by Leonard Holm MD at [facility] ENDOSCOPY    HYSTERECTOMY (CERVIX STATUS UNKNOWN)      ORTHOPEDIC SURGERY      bilateral knee    TONSILLECTOMY AND ADENOIDECTOMY         Social History:  Social History     Socioeconomic History    Marital status:      Spouse name: Not on file    Number of children: Not on file    Years of education: Not on file    Highest education level: Not on file   Occupational History    Not on file   Tobacco Use    Smoking status: Never    Smokeless tobacco: Never   Substance and Sexual Activity    Alcohol use: No    Drug use: No    Sexual activity: Not on file   Other Topics Concern    Not on file   Social History Narrative    Not on file     Social Determinants of Health     Financial Resource Strain: Not on file   Food Insecurity: Not on file   Transportation Needs: Not on file   Physical Activity: Not on file   Stress: Not on file   Social Connections: Not on file   Intimate Partner Violence: Not on file   Housing Stability: Not on file       Family History:   Family History   Problem Relation Age of Onset    Hypertension Mother     Elevated Lipids Mother     Drug Abuse Brother     Stroke Maternal Grandmother     Diabetes Maternal Grandmother     Muscular Dystrophy Maternal Uncle     Breast Cancer Maternal Aunt 52    Muscular Dystrophy Maternal Aunt     Cancer Maternal Aunt        Current Outpatient Medications on File Prior to Visit   Medication Sig Dispense Refill    busPIRone (BUSPAR) 15 MG tablet Take 1 tablet by mouth 3 times daily as needed      Calcium Carbonate-Vitamin D (CALCIUM-VITAMIN D) 600-125 MG-UNIT TABS Take by mouth      clonazePAM (KLONOPIN) 1 MG tablet Take by mouth 2 times daily. FLUoxetine (PROZAC) 40 MG capsule Take 40 mg by mouth 2 times daily      loratadine-pseudoephedrine (CLARITIN-D 12HR) 5-120 MG per extended release tablet Take 1 tablet by mouth 2 times daily      naloxone 4 MG/0.1ML LIQD nasal spray Use 1 spray intranasally, then discard. Repeat with new spray every 2 min as needed for opioid overdose symptoms, alternating nostrils. raNITIdine (ZANTAC) 75 MG tablet Take 75 mg by mouth 2 times daily      traZODone (DESYREL) 50 MG tablet Take 1 tablet by mouth 2 times daily       No current facility-administered medications on file prior to visit. No Known Allergies    Review of Systems:  Review of Systems   Constitutional: Negative. HENT: Negative. Eyes: Negative. Respiratory: Negative. Cardiovascular: Negative. Gastrointestinal: Negative. Endocrine: Negative. Genitourinary: Negative. Musculoskeletal: Negative. Skin: Negative. Neurological:         Memory loss   Psychiatric/Behavioral:  The patient is nervous/anxious. No flowsheet data found. No flowsheet data found. Vitals:    09/19/22 1300   BP: (!) 160/106   Site: Right Upper Arm   Position: Sitting   Pulse: 76   Weight: 286 lb (129.7 kg)   Height: 5' 9\" (1.753 m)        Physical Exam  Constitutional:       Appearance: Normal appearance. HENT:      Head: Normocephalic and atraumatic. Eyes:      Extraocular Movements: Extraocular movements intact and EOM normal.      Pupils: Pupils are equal, round, and reactive to light. Cardiovascular:      Rate and Rhythm: Normal rate and regular rhythm. Pulses: Normal pulses. Pulmonary:      Effort: Pulmonary effort is normal.   Abdominal:      Palpations: Abdomen is soft.    Neurological:      Mental pleasant but also interprets questioning as the physician being agitated which I certainly am not. She is very pleasant very appropriate. And I do think her memory loss is greatly associated with the severe anxiety. Throughout the evaluation with even the simplest of questions she immediately looks at her  waiting for him to answer. Most of the time he does not answer and then she goes on and actually can be a little bit more specific. And at times she is very specific especially about losing her job in the past.  At this time were to move forward with an MRI to make sure there is no frontal lobe lesion or stroke as well as working to move forward with neuropsychiatric testing.  -     Amb External Referral To Neuropsychology    Cerebrovascular accident (CVA), unspecified mechanism ([facility] [address]  -     MRI BRAIN [address]; Future        The Diagnosis and differential diagnostic considerations, and Rx Tx were reviewed with the patient at length. Orders Placed This Encounter   Procedures    MRI BRAIN W WO CONTRAST     Standing Status:   Future     Standing Expiration Date:   9/19/2023     Order Specific Question:   STAT Creatinine as needed:     Answer: Yes    Amb External Referral To Neuropsychology     Referral Priority:   Routine     Requested Specialty:   Psychology     Number of Visits Requested:   1          I have spent greater than 50% of visit discussing and counseling of patient  for treatment and diagnostic plan review. Total time 45 min     . Notes: Patient is to continue all medications as directed by prescribing physicians. Continuations on today's visit are made based on the patient's report of current medications.              Dr. Yina Guthrie  Consultation Neurology, Neurodiagnostics and Neurotherapeutics  Neuroelectrophysiology, EEG, EMG  [facility]  [address] [place] [address], [address] [facility]  Phone:  [phone]  Fax:   [phone]

## 2022-09-27 NOTE — ROUTINE PROCESS
Pt. Discharged to car by Libra Willingham with  . Vital signs stable. Able to tolerate PO fluids. Passing gas.  Seen by MD. [facility]

## 2022-10-27 ENCOUNTER — PATIENT MESSAGE (OUTPATIENT)
Dept: NEUROLOGY | Age: 57
End: 2022-10-27

## 2022-11-01 DIAGNOSIS — E53.8 B12 DEFICIENCY: ICD-10-CM

## 2022-11-01 DIAGNOSIS — G45.9 TIA (TRANSIENT ISCHEMIC ATTACK): Primary | ICD-10-CM

## 2022-11-01 NOTE — TELEPHONE ENCOUNTER
I spoke with the patient's  and informed him that the labs have been ordered and the MRI is scheduled for 11/08/2022.

## 2022-11-02 DIAGNOSIS — E53.8 B12 DEFICIENCY: ICD-10-CM

## 2022-11-02 DIAGNOSIS — G45.9 TIA (TRANSIENT ISCHEMIC ATTACK): ICD-10-CM

## 2022-11-02 LAB — VIT B12 SERPL-MCNC: 416 PG/ML (ref 193–986)

## 2022-11-03 LAB — RPR SER QL: NONREACTIVE

## 2022-11-04 LAB
ACE SERPL-CCNC: 51 U/L (ref 14–82)
ANA SER QL: NEGATIVE

## 2022-11-07 LAB
ALBUMIN SERPL ELPH-MCNC: 3.3 G/DL (ref 2.9–4.4)
ALBUMIN/GLOB SERPL: 0.9 {RATIO} (ref 0.7–1.7)
ALPHA1 GLOB SERPL ELPH-MCNC: 0.2 G/DL (ref 0–0.4)
ALPHA2 GLOB SERPL ELPH-MCNC: 0.8 G/DL (ref 0.4–1)
B-GLOBULIN SERPL ELPH-MCNC: 1.2 G/DL (ref 0.7–1.3)
GAMMA GLOB SERPL ELPH-MCNC: 1.2 G/DL (ref 0.4–1.8)
GLOBULIN SER CALC-MCNC: 3.5 G/DL (ref 2.2–3.9)
M PROTEIN SERPL ELPH-MCNC: NORMAL G/DL
PROT SERPL-MCNC: 6.8 G/DL (ref 6–8.5)

## 2022-11-08 ENCOUNTER — HOSPITAL ENCOUNTER (OUTPATIENT)
Dept: MRI IMAGING | Age: 57
Discharge: HOME | End: 2022-11-11
Payer: COMMERCIAL

## 2022-11-08 DIAGNOSIS — I63.9 CEREBROVASCULAR ACCIDENT (CVA), UNSPECIFIED MECHANISM (HCC): ICD-10-CM

## 2022-11-08 PROCEDURE — A9579 GAD-BASE MR CONTRAST NOS,1ML: HCPCS | Performed by: PSYCHIATRY & NEUROLOGY

## 2022-11-08 PROCEDURE — 6360000004 HC RX CONTRAST MEDICATION: Performed by: PSYCHIATRY & NEUROLOGY

## 2022-11-08 PROCEDURE — 70553 MRI BRAIN STEM W/O & W/DYE: CPT

## 2022-11-08 RX ADMIN — GADOTERIDOL 25 ML: 279.3 INJECTION, SOLUTION INTRAVENOUS at 17:01

## 2022-11-09 ENCOUNTER — TELEPHONE (OUTPATIENT)
Dept: NEUROLOGY | Age: 57
End: 2022-11-09

## 2022-11-10 NOTE — TELEPHONE ENCOUNTER
I spoke with the  he is aware of the MRI results but he was questioning about the neuropsychiatric evaluation he has not been called yet.   I was wondering if he can look into that Crystal?

## 2023-02-15 ENCOUNTER — CLINICAL DOCUMENTATION (OUTPATIENT)
Dept: NEUROLOGY | Age: 58
End: 2023-02-15

## 2023-02-15 NOTE — PROGRESS NOTES
I reviewed the neuropsychiatric testing done by Dr. Abisai Abdi that is in the media section of the chart. The cognitive portion of the neuropsychiatric test was invalid. So the cognitive evaluation could not be performed. He was able to diagnose generalized anxiety recurrent major depression obsessive-compulsive disorder. And referred her back to  for further management of medications for these issues. Obviously we cannot make any reference and raise of cognition and memory associated with this neuropsychiatric evaluation but there is very little to support that there is an underlying problem. But there is no question there is a pronounced involvement of the generalized anxiety major depression obsessive-compulsive disorder that is adversely impacting her cognition.

## 2023-02-27 ENCOUNTER — OFFICE VISIT (OUTPATIENT)
Dept: FAMILY MEDICINE CLINIC | Facility: CLINIC | Age: 58
End: 2023-02-27
Payer: COMMERCIAL

## 2023-02-27 VITALS
HEART RATE: 90 BPM | DIASTOLIC BLOOD PRESSURE: 80 MMHG | OXYGEN SATURATION: 92 % | SYSTOLIC BLOOD PRESSURE: 130 MMHG | TEMPERATURE: 99 F | BODY MASS INDEX: 43.25 KG/M2 | HEIGHT: 69 IN | WEIGHT: 292 LBS

## 2023-02-27 DIAGNOSIS — J01.11 ACUTE RECURRENT FRONTAL SINUSITIS: Primary | ICD-10-CM

## 2023-02-27 DIAGNOSIS — F33.2 MAJOR DEPRESSIVE DISORDER, RECURRENT SEVERE WITHOUT PSYCHOTIC FEATURES (HCC): ICD-10-CM

## 2023-02-27 DIAGNOSIS — E66.01 OBESITY, CLASS III, BMI 40-49.9 (MORBID OBESITY) (HCC): ICD-10-CM

## 2023-02-27 LAB
EXP DATE SOLUTION: NORMAL
EXP DATE SWAB: NORMAL
EXPIRATION DATE: NORMAL
GROUP A STREP ANTIGEN, POC: NEGATIVE
INFLUENZA A ANTIGEN, POC: NEGATIVE
INFLUENZA B ANTIGEN, POC: NEGATIVE
LOT NUMBER POC: NORMAL
LOT NUMBER SOLUTION: NORMAL
LOT NUMBER SWAB: NORMAL
SARS-COV-2 RNA, POC: NEGATIVE
VALID INTERNAL CONTROL, POC: YES
VALID INTERNAL CONTROL, POC: YES

## 2023-02-27 PROCEDURE — 99213 OFFICE O/P EST LOW 20 MIN: CPT | Performed by: FAMILY MEDICINE

## 2023-02-27 PROCEDURE — 87635 SARS-COV-2 COVID-19 AMP PRB: CPT | Performed by: FAMILY MEDICINE

## 2023-02-27 PROCEDURE — 3079F DIAST BP 80-89 MM HG: CPT | Performed by: FAMILY MEDICINE

## 2023-02-27 PROCEDURE — 87804 INFLUENZA ASSAY W/OPTIC: CPT | Performed by: FAMILY MEDICINE

## 2023-02-27 PROCEDURE — 87880 STREP A ASSAY W/OPTIC: CPT | Performed by: FAMILY MEDICINE

## 2023-02-27 PROCEDURE — 96372 THER/PROPH/DIAG INJ SC/IM: CPT | Performed by: FAMILY MEDICINE

## 2023-02-27 PROCEDURE — 3075F SYST BP GE 130 - 139MM HG: CPT | Performed by: FAMILY MEDICINE

## 2023-02-27 RX ORDER — TRIAMCINOLONE ACETONIDE 40 MG/ML
40 INJECTION, SUSPENSION INTRA-ARTICULAR; INTRAMUSCULAR ONCE
Status: COMPLETED | OUTPATIENT
Start: 2023-02-27 | End: 2023-02-27

## 2023-02-27 RX ORDER — DOXYCYCLINE 100 MG/1
100 TABLET ORAL 2 TIMES DAILY
Qty: 20 TABLET | Refills: 0 | Status: SHIPPED | OUTPATIENT
Start: 2023-02-27 | End: 2023-03-09

## 2023-02-27 RX ORDER — GUAIFENESIN AND DEXTROMETHORPHAN HYDROBROMIDE 1200; 60 MG/1; MG/1
1 TABLET, EXTENDED RELEASE ORAL EVERY 12 HOURS
Qty: 20 TABLET | Refills: 0 | Status: SHIPPED | OUTPATIENT
Start: 2023-02-27 | End: 2023-03-09

## 2023-02-27 RX ADMIN — TRIAMCINOLONE ACETONIDE 40 MG: 40 INJECTION, SUSPENSION INTRA-ARTICULAR; INTRAMUSCULAR at 16:50

## 2023-02-27 ASSESSMENT — ENCOUNTER SYMPTOMS
SORE THROAT: 1
SINUS PAIN: 1
NAUSEA: 0
SINUS PRESSURE: 0
VOICE CHANGE: 0
VOMITING: 0
WHEEZING: 0
COUGH: 0
RHINORRHEA: 1

## 2023-02-27 ASSESSMENT — PATIENT HEALTH QUESTIONNAIRE - PHQ9: DEPRESSION UNABLE TO ASSESS: FUNCTIONAL CAPACITY MOTIVATION LIMITS ACCURACY

## 2023-02-27 NOTE — PROGRESS NOTES
PROGRESS NOTE    SUBJECTIVE:   Cholo Schuster is a 62 y.o. female seen for a follow up visit regarding the following chief complaint:     Chief Complaint   Patient presents with    Cough     Sore throat    Congestion     Cough since Friday , chills,            HPI patient is complaining of sore throat cough congestion since Friday never took her temperature      Past Medical History, Past Surgical History, Family history, Social History, and Medications were all reviewed with the patient today and updated as necessary. Current Outpatient Medications   Medication Sig Dispense Refill    doxycycline monohydrate (ADOXA) 100 MG tablet Take 1 tablet by mouth 2 times daily for 10 days 20 tablet 0    Dextromethorphan-guaiFENesin (MUCINEX DM MAXIMUM STRENGTH)  MG TB12 Take 1 tablet by mouth every 12 hours for 10 days 20 tablet 0    busPIRone (BUSPAR) 15 MG tablet Take 1 tablet by mouth 3 times daily as needed      Calcium Carbonate-Vitamin D (CALCIUM-VITAMIN D) 600-125 MG-UNIT TABS Take by mouth      clonazePAM (KLONOPIN) 1 MG tablet Take by mouth 2 times daily. FLUoxetine (PROZAC) 40 MG capsule Take 40 mg by mouth 2 times daily      loratadine-pseudoephedrine (CLARITIN-D 12HR) 5-120 MG per extended release tablet Take 1 tablet by mouth 2 times daily      naloxone 4 MG/0.1ML LIQD nasal spray Use 1 spray intranasally, then discard. Repeat with new spray every 2 min as needed for opioid overdose symptoms, alternating nostrils.       raNITIdine (ZANTAC) 75 MG tablet Take 75 mg by mouth 2 times daily      traZODone (DESYREL) 50 MG tablet Take 1 tablet by mouth 2 times daily       Current Facility-Administered Medications   Medication Dose Route Frequency Provider Last Rate Last Admin    triamcinolone acetonide (KENALOG-40) injection 40 mg  40 mg IntraMUSCular Once Arcelia Gowers, DO         No Known Allergies  Patient Active Problem List   Diagnosis    Hypertension    Anxiety    Mild depression Environmental and seasonal allergies    Depression    Insomnia    Obesity    Hypercholesterolemia    GERD (gastroesophageal reflux disease)    Obesity, morbid (HCC)    Major depressive disorder, recurrent severe without psychotic features ([facility]     Past Medical History:   Diagnosis Date    Anxiety     Depression     Environmental and seasonal allergies     GERD (gastroesophageal reflux disease)     Hypercholesterolemia     Hypertension     Poor historian      Past Surgical History:   Procedure Laterality Date    APPENDECTOMY      BACK SURGERY      CHOLECYSTECTOMY      COLONOSCOPY N/A 4/30/2018    COLONOSCOPY performed by Pancho Duque MD at [facility] ENDOSCOPY    HYSTERECTOMY (CERVIX STATUS UNKNOWN)      ORTHOPEDIC SURGERY      bilateral knee    TONSILLECTOMY AND ADENOIDECTOMY       Family History   Problem Relation Age of Onset    Hypertension Mother     Elevated Lipids Mother     Drug Abuse Brother     Stroke Maternal Grandmother     Diabetes Maternal Grandmother     Muscular Dystrophy Maternal Uncle     Breast Cancer Maternal Aunt 52    Muscular Dystrophy Maternal Aunt     Cancer Maternal Aunt      Social History     Tobacco Use    Smoking status: Never    Smokeless tobacco: Never   Substance Use Topics    Alcohol use: No         Review of Systems   Constitutional:  Positive for chills and fever. HENT:  Positive for rhinorrhea, sinus pain and sore throat. Negative for congestion, sinus pressure, sneezing and voice change. Respiratory:  Negative for cough and wheezing. Cardiovascular:  Negative for chest pain. Gastrointestinal:  Negative for nausea and vomiting. Musculoskeletal:  Negative for arthralgias. Skin:  Negative for rash. Neurological:  Negative for headaches. Hematological:  Negative for adenopathy.        OBJECTIVE:  /80 (Site: Left Upper Arm, Position: Sitting)   Pulse 90   Temp 99 °F (37.2 °C) (Oral)   Ht 5' 9\" (1.753 m)   Wt 292 lb (132.5 kg)   SpO2 92%   BMI 43.12 kg/m² Physical Exam  Vitals and nursing note reviewed. Constitutional:       General: She is not in acute distress. Appearance: Normal appearance. HENT:      Nose: Congestion and rhinorrhea present. Mouth/Throat:      Pharynx: Posterior oropharyngeal erythema present. Eyes:      Extraocular Movements: Extraocular movements intact. Conjunctiva/sclera: Conjunctivae normal.   Cardiovascular:      Rate and Rhythm: Normal rate and regular rhythm. Pulmonary:      Effort: Pulmonary effort is normal.      Breath sounds: Normal breath sounds. Skin:     General: Skin is warm and dry. Neurological:      Mental Status: She is alert. Psychiatric:         Mood and Affect: Mood normal.         Behavior: Behavior normal.         Thought Content: Thought content normal.         Judgment: Judgment normal.        Medical problems and test results were reviewed with the patient today.      Recent Results (from the past 672 hour(s))   AMB POC RAPID INFLUENZA TEST    Collection Time: 02/27/23 11:44 AM   Result Value Ref Range    Valid Internal Control, POC yes     Influenza A Antigen, POC Negative Negative    Influenza B Antigen, POC Negative Negative   AMB POC COVID-19 COV    Collection Time: 02/27/23 11:47 AM   Result Value Ref Range    SARS-COV-2 RNA, POC Negative     Lot number swab      EXP date swab      Lot number solution      EXP date solution      LOT NUMBER POC      EXPIRATION DATE     AMB POC RAPID STREP A    Collection Time: 02/27/23 11:47 AM   Result Value Ref Range    Valid Internal Control, POC yes     Group A Strep Antigen, POC Negative Negative       ASSESSMENT and PLAN    Visit Diagnoses and Associated Orders       Acute recurrent frontal sinusitis    -  Primary    AMB POC COVID-19 COV [51423 CPT(R)]      AMB POC RAPID INFLUENZA TEST [41330 CPT(R)]      AMB POC RAPID STREP A [66413 CPT(R)]      triamcinolone acetonide (KENALOG-40) injection 40 mg [8120]      doxycycline monohydrate (ADOXA) 100 MG tablet [38040]      Dextromethorphan-guaiFENesin ([place] [address] DM MAXIMUM STRENGTH)  MG TB12 [81008]           Major depressive disorder, recurrent severe without psychotic features ([facility]             Obesity, Class III, BMI 40-49.9 (morbid obesity) ([facility]                         Diagnosis Orders   1. Acute recurrent frontal sinusitis  AMB POC COVID-19 COV    AMB POC RAPID INFLUENZA TEST    AMB POC RAPID STREP A    triamcinolone acetonide (KENALOG-40) injection 40 mg    doxycycline monohydrate (ADOXA) 100 MG tablet    Dextromethorphan-guaiFENesin (MUCINEX DM MAXIMUM STRENGTH)  MG TB12      2. Major depressive disorder, recurrent severe without psychotic features ([facility]        3. Obesity, Class III, BMI 40-49.9 (morbid obesity) ([facility]        , Va Mauro was seen today for cough and congestion. Diagnoses and all orders for this visit:    Acute recurrent frontal sinusitis  -     AMB POC COVID-19 COV  -     AMB POC RAPID INFLUENZA TEST  -     AMB POC RAPID STREP A  -     triamcinolone acetonide (KENALOG-40) injection 40 mg  -     doxycycline monohydrate (ADOXA) 100 MG tablet;  Take 1 tablet by mouth 2 times daily for 10 days  -     Dextromethorphan-guaiFENesin (MUCINEX DM MAXIMUM STRENGTH)  MG TB12; Take 1 tablet by mouth every 12 hours for 10 days    Major depressive disorder, recurrent severe without psychotic features (HCC)    Obesity, Class III, BMI 40-49.9 (morbid obesity) ([facility]  , We will start patient on doxycycline in combination with a Kenalog shot recommended Mucinex DM and Flonase nasal spray reviewed her labs in terms of a COVID flu and strep all came back negative

## 2023-04-19 ENCOUNTER — OFFICE VISIT (OUTPATIENT)
Dept: NEUROLOGY | Age: 58
End: 2023-04-19
Payer: COMMERCIAL

## 2023-04-19 DIAGNOSIS — R41.3 MEMORY LOSS: Primary | ICD-10-CM

## 2023-04-19 PROCEDURE — 99214 OFFICE O/P EST MOD 30 MIN: CPT | Performed by: PSYCHIATRY & NEUROLOGY

## 2023-04-19 ASSESSMENT — ENCOUNTER SYMPTOMS
GASTROINTESTINAL NEGATIVE: 1
EYES NEGATIVE: 1
RESPIRATORY NEGATIVE: 1
ALLERGIC/IMMUNOLOGIC NEGATIVE: 1

## 2023-04-19 NOTE — PROGRESS NOTES
Pulmonary effort is normal.   Abdominal:      General: Abdomen is flat. Neurological:      Mental Status: She is alert and oriented to person, place, and time. Gait: Gait is intact. Deep Tendon Reflexes:      Reflex Scores:       Tricep reflexes are 1+ on the right side and 1+ on the left side. Bicep reflexes are 1+ on the right side and 1+ on the left side. Brachioradialis reflexes are 1+ on the right side and 1+ on the left side. Patellar reflexes are 1+ on the right side and 1+ on the left side. Achilles reflexes are 1+ on the right side and 1+ on the left side. Neurologic Exam     Mental Status   Oriented to person, place, and time. Attention: decreased. Concentration: decreased. Level of consciousness: alert  Knowledge: poor. There is [place] no way Mini-Mental Status exam or any exam would be accurate with the amount of anxiety crying that she does not     Cranial Nerves     CN II   Visual fields full to confrontation. CN III, IV, VI   Pupils are equal, round, and reactive to light. Extraocular motions are normal.     CN VII   Facial expression full, symmetric. Motor Exam   Right arm tone: normal  Left arm tone: normal  Right leg tone: normal  Left leg tone: normal    Gait, Coordination, and Reflexes     Gait  Gait: normal    Tremor   Resting tremor: absent  Intention tremor: absent  Action tremor: absent    Reflexes   Right brachioradialis: 1+  Left brachioradialis: 1+  Right biceps: 1+  Left biceps: 1+  Right triceps: 1+  Left triceps: 1+  Right patellar: 1+  Left patellar: 1+  Right achilles: 1+  Left achilles: 1+        Assessment   Assessment / Plan:    Diagnoses and all orders for this visit:    Memory loss evident that this patient focuses OCD is 1 aspect of the conversation and that if you can get her off of that she will sit in tears. For example.   I went through the process of how we diagnose Alzheimer's type dementia which includes a PET scan

## 2023-05-04 ENCOUNTER — HOSPITAL ENCOUNTER (OUTPATIENT)
Dept: PET IMAGING | Age: 58
Discharge: HOME | End: 2023-05-04
Payer: COMMERCIAL

## 2023-05-04 DIAGNOSIS — R41.3 MEMORY LOSS: ICD-10-CM

## 2023-05-04 LAB
GLUCOSE BLD STRIP.AUTO-MCNC: 196 MG/DL (ref 65–100)
SERVICE CMNT-IMP: ABNORMAL

## 2023-05-04 PROCEDURE — 82962 GLUCOSE BLOOD TEST: CPT

## 2023-05-04 PROCEDURE — 78608 BRAIN IMAGING (PET): CPT

## 2023-05-04 PROCEDURE — 2580000003 HC RX 258: Performed by: PSYCHIATRY & NEUROLOGY

## 2023-05-04 PROCEDURE — A9552 F18 FDG: HCPCS | Performed by: PSYCHIATRY & NEUROLOGY

## 2023-05-04 PROCEDURE — 3430000000 HC RX DIAGNOSTIC RADIOPHARMACEUTICAL: Performed by: PSYCHIATRY & NEUROLOGY

## 2023-05-04 RX ORDER — SODIUM CHLORIDE 0.9 % (FLUSH) 0.9 %
20 SYRINGE (ML) INJECTION AS NEEDED
Status: DISCONTINUED | OUTPATIENT
Start: 2023-05-04 | End: 2023-05-08 | Stop reason: HOSPADM

## 2023-05-04 RX ORDER — FLUDEOXYGLUCOSE F 18 200 MCI/ML
10.21 INJECTION, SOLUTION INTRAVENOUS
Status: COMPLETED | OUTPATIENT
Start: 2023-05-04 | End: 2023-05-04

## 2023-05-04 RX ADMIN — SODIUM CHLORIDE, PRESERVATIVE FREE 20 ML: 5 INJECTION INTRAVENOUS at 08:12

## 2023-05-04 RX ADMIN — FLUDEOXYGLUCOSE F 18 10.21 MILLICURIE: 200 INJECTION, SOLUTION INTRAVENOUS at 08:12

## 2023-05-09 ENCOUNTER — CLINICAL DOCUMENTATION (OUTPATIENT)
Dept: NEUROLOGY | Age: 58
End: 2023-05-09

## 2023-05-09 RX ORDER — MEMANTINE HYDROCHLORIDE 10 MG/1
TABLET ORAL
Qty: 60 TABLET | Refills: 9 | Status: SHIPPED | OUTPATIENT
Start: 2023-05-09

## 2023-06-05 ENCOUNTER — NURSE ONLY (OUTPATIENT)
Dept: FAMILY MEDICINE CLINIC | Facility: CLINIC | Age: 58
End: 2023-06-05
Payer: COMMERCIAL

## 2023-06-05 DIAGNOSIS — E55.9 VITAMIN D DEFICIENCY: ICD-10-CM

## 2023-06-05 DIAGNOSIS — I10 PRIMARY HYPERTENSION: ICD-10-CM

## 2023-06-05 DIAGNOSIS — E78.00 HYPERCHOLESTEROLEMIA: ICD-10-CM

## 2023-06-05 DIAGNOSIS — Z00.00 LABORATORY EXAMINATION ORDERED AS PART OF A ROUTINE GENERAL MEDICAL EXAMINATION: Primary | ICD-10-CM

## 2023-06-05 LAB
25(OH)D3 SERPL-MCNC: 41.9 NG/ML (ref 30–100)
ALBUMIN SERPL-MCNC: 3.4 G/DL (ref 3.5–5)
ALBUMIN/GLOB SERPL: 0.9 (ref 0.4–1.6)
ALP SERPL-CCNC: 103 U/L (ref 50–136)
ALT SERPL-CCNC: 49 U/L (ref 12–65)
ANION GAP SERPL CALC-SCNC: 5 MMOL/L (ref 2–11)
AST SERPL-CCNC: 41 U/L (ref 15–37)
BILIRUB SERPL-MCNC: 0.3 MG/DL (ref 0.2–1.1)
BILIRUBIN, URINE, POC: ABNORMAL
BLOOD URINE, POC: ABNORMAL
BUN SERPL-MCNC: 11 MG/DL (ref 6–23)
CALCIUM SERPL-MCNC: 9.3 MG/DL (ref 8.3–10.4)
CHLORIDE SERPL-SCNC: 107 MMOL/L (ref 101–110)
CHOLEST SERPL-MCNC: 189 MG/DL
CO2 SERPL-SCNC: 30 MMOL/L (ref 21–32)
CREAT SERPL-MCNC: 0.9 MG/DL (ref 0.6–1)
GLOBULIN SER CALC-MCNC: 3.7 G/DL (ref 2.8–4.5)
GLUCOSE SERPL-MCNC: 164 MG/DL (ref 65–100)
GLUCOSE URINE, POC: NEGATIVE
GRANS ABSOLUTE, POC: 5.1 K/UL
GRANULOCYTES %, POC: 60.9 %
HDLC SERPL-MCNC: 45 MG/DL (ref 40–60)
HDLC SERPL: 4.2
HEMATOCRIT, POC: 42 %
HEMOGLOBIN, POC: 13 G/DL
HIV 1+2 AB+HIV1 P24 AG SERPL QL IA: NONREACTIVE
HIV 1/2 RESULT COMMENT: NORMAL
KETONES, URINE, POC: ABNORMAL
LDLC SERPL CALC-MCNC: 125.2 MG/DL
LEUKOCYTE ESTERASE, URINE, POC: ABNORMAL
LYMPHOCYTE %, POC: 33.7 %
LYMPHS ABSOLUTE, POC: 2.8 K/UL
MCH, POC: NORMAL PG (ref 40–?)
MCHC, POC: 31
MCV, POC: 88.9
MONOCYTE %, POC: 5.4 %
MONOCYTE, ABSOLUTE POC: 0.5 K/UL
MPV, POC: 7.3 FL
NITRITE, URINE, POC: NEGATIVE
PH, URINE, POC: 5.5 (ref 4.6–8)
PLATELET COUNT, POC: 344 K/UL
POTASSIUM SERPL-SCNC: 3.9 MMOL/L (ref 3.5–5.1)
PROT SERPL-MCNC: 7.1 G/DL (ref 6.3–8.2)
PROTEIN,URINE, POC: NEGATIVE
RBC, POC: 4.72 M/UL
RDW, POC: 14.5 %
SODIUM SERPL-SCNC: 142 MMOL/L (ref 133–143)
SPECIFIC GRAVITY, URINE, POC: 1.03 (ref 1–1.03)
TRIGL SERPL-MCNC: 94 MG/DL (ref 35–150)
TSH, 3RD GENERATION: 2.61 UIU/ML (ref 0.36–3.74)
URINALYSIS CLARITY, POC: ABNORMAL
URINALYSIS COLOR, POC: ABNORMAL
UROBILINOGEN, POC: NORMAL
VLDLC SERPL CALC-MCNC: 18.8 MG/DL (ref 6–23)
WBC, POC: 8.4 K/UL

## 2023-06-05 PROCEDURE — 81002 URINALYSIS NONAUTO W/O SCOPE: CPT | Performed by: FAMILY MEDICINE

## 2023-06-05 PROCEDURE — 85025 COMPLETE CBC W/AUTO DIFF WBC: CPT | Performed by: FAMILY MEDICINE

## 2023-06-21 ENCOUNTER — OFFICE VISIT (OUTPATIENT)
Dept: FAMILY MEDICINE CLINIC | Facility: CLINIC | Age: 58
End: 2023-06-21
Payer: COMMERCIAL

## 2023-06-21 VITALS
BODY MASS INDEX: 43.25 KG/M2 | WEIGHT: 292 LBS | DIASTOLIC BLOOD PRESSURE: 100 MMHG | HEIGHT: 69 IN | SYSTOLIC BLOOD PRESSURE: 146 MMHG

## 2023-06-21 DIAGNOSIS — E66.01 MORBID (SEVERE) OBESITY DUE TO EXCESS CALORIES (HCC): ICD-10-CM

## 2023-06-21 DIAGNOSIS — K21.9 GASTROESOPHAGEAL REFLUX DISEASE WITHOUT ESOPHAGITIS: ICD-10-CM

## 2023-06-21 DIAGNOSIS — E78.00 HYPERCHOLESTEROLEMIA: ICD-10-CM

## 2023-06-21 DIAGNOSIS — Z13.31 SCREENING FOR DEPRESSION: ICD-10-CM

## 2023-06-21 DIAGNOSIS — F33.2 MAJOR DEPRESSIVE DISORDER, RECURRENT SEVERE WITHOUT PSYCHOTIC FEATURES (HCC): ICD-10-CM

## 2023-06-21 DIAGNOSIS — E55.9 VITAMIN D DEFICIENCY: ICD-10-CM

## 2023-06-21 DIAGNOSIS — F41.1 GENERALIZED ANXIETY DISORDER: ICD-10-CM

## 2023-06-21 DIAGNOSIS — I10 PRIMARY HYPERTENSION: ICD-10-CM

## 2023-06-21 DIAGNOSIS — Z00.00 ROUTINE GENERAL MEDICAL EXAMINATION AT A HEALTH CARE FACILITY: Primary | ICD-10-CM

## 2023-06-21 PROCEDURE — 3077F SYST BP >= 140 MM HG: CPT | Performed by: FAMILY MEDICINE

## 2023-06-21 PROCEDURE — 3080F DIAST BP >= 90 MM HG: CPT | Performed by: FAMILY MEDICINE

## 2023-06-21 PROCEDURE — 99396 PREV VISIT EST AGE 40-64: CPT | Performed by: FAMILY MEDICINE

## 2023-06-21 RX ORDER — TRAZODONE HYDROCHLORIDE 50 MG/1
50 TABLET ORAL 2 TIMES DAILY
Qty: 90 TABLET | Refills: 3 | Status: SHIPPED | OUTPATIENT
Start: 2023-06-21

## 2023-06-21 RX ORDER — VALSARTAN 160 MG/1
160 TABLET ORAL DAILY
Qty: 90 TABLET | Refills: 3 | Status: SHIPPED | OUTPATIENT
Start: 2023-06-21

## 2023-06-21 RX ORDER — SERTRALINE HYDROCHLORIDE 100 MG/1
TABLET, FILM COATED ORAL
COMMUNITY
Start: 2023-06-04

## 2023-06-21 RX ORDER — RANITIDINE HCL 75 MG
75 TABLET ORAL 2 TIMES DAILY
Qty: 180 TABLET | Refills: 3 | Status: SHIPPED | OUTPATIENT
Start: 2023-06-21

## 2023-06-21 SDOH — ECONOMIC STABILITY: FOOD INSECURITY: WITHIN THE PAST 12 MONTHS, YOU WORRIED THAT YOUR FOOD WOULD RUN OUT BEFORE YOU GOT MONEY TO BUY MORE.: NEVER TRUE

## 2023-06-21 SDOH — ECONOMIC STABILITY: INCOME INSECURITY: HOW HARD IS IT FOR YOU TO PAY FOR THE VERY BASICS LIKE FOOD, HOUSING, MEDICAL CARE, AND HEATING?: NOT HARD AT ALL

## 2023-06-21 SDOH — ECONOMIC STABILITY: FOOD INSECURITY: WITHIN THE PAST 12 MONTHS, THE FOOD YOU BOUGHT JUST DIDN'T LAST AND YOU DIDN'T HAVE MONEY TO GET MORE.: NEVER TRUE

## 2023-06-21 SDOH — ECONOMIC STABILITY: HOUSING INSECURITY
IN THE LAST 12 MONTHS, WAS THERE A TIME WHEN YOU DID NOT HAVE A STEADY PLACE TO SLEEP OR SLEPT IN A SHELTER (INCLUDING NOW)?: NO

## 2023-06-21 ASSESSMENT — PATIENT HEALTH QUESTIONNAIRE - PHQ9
3. TROUBLE FALLING OR STAYING ASLEEP: 1
SUM OF ALL RESPONSES TO PHQ QUESTIONS 1-9: 4
9. THOUGHTS THAT YOU WOULD BE BETTER OFF DEAD, OR OF HURTING YOURSELF: 0
7. TROUBLE CONCENTRATING ON THINGS, SUCH AS READING THE NEWSPAPER OR WATCHING TELEVISION: 1
1. LITTLE INTEREST OR PLEASURE IN DOING THINGS: 1
5. POOR APPETITE OR OVEREATING: 0
10. IF YOU CHECKED OFF ANY PROBLEMS, HOW DIFFICULT HAVE THESE PROBLEMS MADE IT FOR YOU TO DO YOUR WORK, TAKE CARE OF THINGS AT HOME, OR GET ALONG WITH OTHER PEOPLE: 1
4. FEELING TIRED OR HAVING LITTLE ENERGY: 0
SUM OF ALL RESPONSES TO PHQ9 QUESTIONS 1 & 2: 2
2. FEELING DOWN, DEPRESSED OR HOPELESS: 1
SUM OF ALL RESPONSES TO PHQ QUESTIONS 1-9: 4
6. FEELING BAD ABOUT YOURSELF - OR THAT YOU ARE A FAILURE OR HAVE LET YOURSELF OR YOUR FAMILY DOWN: 0
SUM OF ALL RESPONSES TO PHQ QUESTIONS 1-9: 4
8. MOVING OR SPEAKING SO SLOWLY THAT OTHER PEOPLE COULD HAVE NOTICED. OR THE OPPOSITE, BEING SO FIGETY OR RESTLESS THAT YOU HAVE BEEN MOVING AROUND A LOT MORE THAN USUAL: 0
SUM OF ALL RESPONSES TO PHQ QUESTIONS 1-9: 4

## 2023-06-21 ASSESSMENT — ENCOUNTER SYMPTOMS
ABDOMINAL PAIN: 0
SHORTNESS OF BREATH: 0
COUGH: 0

## 2023-06-21 NOTE — PROGRESS NOTES
PROGRESS NOTE    SUBJECTIVE:   Harsha Schuster is a 62 y.o. female seen for a follow up visit regarding the following chief complaint:     Chief Complaint   Patient presents with    Annual Exam    Discuss Labs           HPI patient presents the office today for complete physical without complaints being followed by neurology for early onset dementia      Past Medical History, Past Surgical History, Family history, Social History, and Medications were all reviewed with the patient today and updated as necessary. Current Outpatient Medications   Medication Sig Dispense Refill    sertraline (ZOLOFT) 100 MG tablet TAKE 3 TABLETS BY MOUTH ONCE DAILY      traZODone (DESYREL) 50 MG tablet Take 1 tablet by mouth 2 times daily 90 tablet 3    raNITIdine (ZANTAC) 75 MG tablet Take 1 tablet by mouth 2 times daily 180 tablet 3    valsartan (DIOVAN) 160 MG tablet Take 1 tablet by mouth daily 90 tablet 3    memantine (NAMENDA) 10 MG tablet Take half bid for one month than 1 bid 60 tablet 9    busPIRone (BUSPAR) 15 MG tablet Take 15 mg by mouth 3 times daily as needed      Calcium Carbonate-Vitamin D (CALCIUM-VITAMIN D) 600-125 MG-UNIT TABS Take by mouth      clonazePAM (KLONOPIN) 1 MG tablet Take by mouth 2 times daily. FLUoxetine (PROZAC) 40 MG capsule Take 1 capsule by mouth 2 times daily      loratadine-pseudoephedrine (CLARITIN-D 12HR) 5-120 MG per extended release tablet Take 1 tablet by mouth 2 times daily      naloxone 4 MG/0.1ML LIQD nasal spray Use 1 spray intranasally, then discard. Repeat with new spray every 2 min as needed for opioid overdose symptoms, alternating nostrils. No current facility-administered medications for this visit.      No Known Allergies  Patient Active Problem List   Diagnosis    Hypertension    Anxiety    Mild depression    Environmental and seasonal allergies    Depression    Insomnia    Obesity    Hypercholesterolemia    GERD (gastroesophageal reflux disease)    Obesity, morbid

## 2023-06-26 ENCOUNTER — OFFICE VISIT (OUTPATIENT)
Dept: FAMILY MEDICINE CLINIC | Facility: CLINIC | Age: 58
End: 2023-06-26
Payer: COMMERCIAL

## 2023-06-26 VITALS
SYSTOLIC BLOOD PRESSURE: 116 MMHG | WEIGHT: 286 LBS | DIASTOLIC BLOOD PRESSURE: 80 MMHG | HEIGHT: 69 IN | BODY MASS INDEX: 42.36 KG/M2

## 2023-06-26 DIAGNOSIS — J01.01 ACUTE RECURRENT MAXILLARY SINUSITIS: Primary | ICD-10-CM

## 2023-06-26 PROCEDURE — 3079F DIAST BP 80-89 MM HG: CPT | Performed by: FAMILY MEDICINE

## 2023-06-26 PROCEDURE — 99213 OFFICE O/P EST LOW 20 MIN: CPT | Performed by: FAMILY MEDICINE

## 2023-06-26 PROCEDURE — 3074F SYST BP LT 130 MM HG: CPT | Performed by: FAMILY MEDICINE

## 2023-06-26 RX ORDER — GUAIFENESIN AND DEXTROMETHORPHAN HYDROBROMIDE 1200; 60 MG/1; MG/1
1 TABLET, EXTENDED RELEASE ORAL EVERY 12 HOURS
Qty: 20 TABLET | Refills: 0 | Status: SHIPPED | OUTPATIENT
Start: 2023-06-26 | End: 2023-07-06

## 2023-06-26 RX ORDER — DOXYCYCLINE 100 MG/1
100 TABLET ORAL 2 TIMES DAILY
Qty: 20 TABLET | Refills: 0 | Status: SHIPPED | OUTPATIENT
Start: 2023-06-26 | End: 2023-07-06

## 2023-06-26 RX ORDER — BUSPIRONE HYDROCHLORIDE 30 MG/1
30 TABLET ORAL 3 TIMES DAILY
COMMUNITY

## 2023-06-26 ASSESSMENT — PATIENT HEALTH QUESTIONNAIRE - PHQ9
SUM OF ALL RESPONSES TO PHQ QUESTIONS 1-9: 3
1. LITTLE INTEREST OR PLEASURE IN DOING THINGS: 1
10. IF YOU CHECKED OFF ANY PROBLEMS, HOW DIFFICULT HAVE THESE PROBLEMS MADE IT FOR YOU TO DO YOUR WORK, TAKE CARE OF THINGS AT HOME, OR GET ALONG WITH OTHER PEOPLE: 0
7. TROUBLE CONCENTRATING ON THINGS, SUCH AS READING THE NEWSPAPER OR WATCHING TELEVISION: 1
SUM OF ALL RESPONSES TO PHQ QUESTIONS 1-9: 3
2. FEELING DOWN, DEPRESSED OR HOPELESS: 1
SUM OF ALL RESPONSES TO PHQ QUESTIONS 1-9: 3
4. FEELING TIRED OR HAVING LITTLE ENERGY: 0
SUM OF ALL RESPONSES TO PHQ QUESTIONS 1-9: 3
3. TROUBLE FALLING OR STAYING ASLEEP: 0
9. THOUGHTS THAT YOU WOULD BE BETTER OFF DEAD, OR OF HURTING YOURSELF: 0
8. MOVING OR SPEAKING SO SLOWLY THAT OTHER PEOPLE COULD HAVE NOTICED. OR THE OPPOSITE, BEING SO FIGETY OR RESTLESS THAT YOU HAVE BEEN MOVING AROUND A LOT MORE THAN USUAL: 0
5. POOR APPETITE OR OVEREATING: 0
6. FEELING BAD ABOUT YOURSELF - OR THAT YOU ARE A FAILURE OR HAVE LET YOURSELF OR YOUR FAMILY DOWN: 0
SUM OF ALL RESPONSES TO PHQ9 QUESTIONS 1 & 2: 2

## 2023-06-26 ASSESSMENT — ENCOUNTER SYMPTOMS
VOICE CHANGE: 0
COUGH: 0
SINUS PAIN: 1
SINUS PRESSURE: 1
RHINORRHEA: 1
WHEEZING: 0
SORE THROAT: 1
NAUSEA: 0
VOMITING: 0

## 2023-07-20 ENCOUNTER — OFFICE VISIT (OUTPATIENT)
Dept: FAMILY MEDICINE CLINIC | Facility: CLINIC | Age: 58
End: 2023-07-20
Payer: COMMERCIAL

## 2023-07-20 VITALS
SYSTOLIC BLOOD PRESSURE: 138 MMHG | HEIGHT: 69 IN | WEIGHT: 283 LBS | DIASTOLIC BLOOD PRESSURE: 86 MMHG | BODY MASS INDEX: 41.92 KG/M2

## 2023-07-20 DIAGNOSIS — I10 PRIMARY HYPERTENSION: Primary | ICD-10-CM

## 2023-07-20 PROCEDURE — 3079F DIAST BP 80-89 MM HG: CPT | Performed by: FAMILY MEDICINE

## 2023-07-20 PROCEDURE — 99442 PR PHYS/QHP TELEPHONE EVALUATION 11-20 MIN: CPT | Performed by: FAMILY MEDICINE

## 2023-07-20 PROCEDURE — 3075F SYST BP GE 130 - 139MM HG: CPT | Performed by: FAMILY MEDICINE

## 2023-07-20 ASSESSMENT — PATIENT HEALTH QUESTIONNAIRE - PHQ9
1. LITTLE INTEREST OR PLEASURE IN DOING THINGS: 1
SUM OF ALL RESPONSES TO PHQ QUESTIONS 1-9: 2
9. THOUGHTS THAT YOU WOULD BE BETTER OFF DEAD, OR OF HURTING YOURSELF: 0
4. FEELING TIRED OR HAVING LITTLE ENERGY: 0
6. FEELING BAD ABOUT YOURSELF - OR THAT YOU ARE A FAILURE OR HAVE LET YOURSELF OR YOUR FAMILY DOWN: 0
SUM OF ALL RESPONSES TO PHQ9 QUESTIONS 1 & 2: 2
8. MOVING OR SPEAKING SO SLOWLY THAT OTHER PEOPLE COULD HAVE NOTICED. OR THE OPPOSITE, BEING SO FIGETY OR RESTLESS THAT YOU HAVE BEEN MOVING AROUND A LOT MORE THAN USUAL: 0
3. TROUBLE FALLING OR STAYING ASLEEP: 0
SUM OF ALL RESPONSES TO PHQ QUESTIONS 1-9: 2
7. TROUBLE CONCENTRATING ON THINGS, SUCH AS READING THE NEWSPAPER OR WATCHING TELEVISION: 0
SUM OF ALL RESPONSES TO PHQ QUESTIONS 1-9: 2
5. POOR APPETITE OR OVEREATING: 0
10. IF YOU CHECKED OFF ANY PROBLEMS, HOW DIFFICULT HAVE THESE PROBLEMS MADE IT FOR YOU TO DO YOUR WORK, TAKE CARE OF THINGS AT HOME, OR GET ALONG WITH OTHER PEOPLE: 0
2. FEELING DOWN, DEPRESSED OR HOPELESS: 1
SUM OF ALL RESPONSES TO PHQ QUESTIONS 1-9: 2

## 2023-07-20 ASSESSMENT — ENCOUNTER SYMPTOMS
NAUSEA: 0
VOMITING: 0
SHORTNESS OF BREATH: 0

## 2023-07-20 NOTE — PROGRESS NOTES
PROGRESS NOTE    SUBJECTIVE:   Lionel Schuster is a 62 y.o. female seen for a follow up visit regarding the following chief complaint:     Chief Complaint   Patient presents with    Hypertension     BP check           HPI patient presents office follow-up of her blood pressure without any new complaints states she is feeling fine      Past Medical History, Past Surgical History, Family history, Social History, and Medications were all reviewed with the patient today and updated as necessary. Current Outpatient Medications   Medication Sig Dispense Refill    busPIRone (BUSPAR) 30 MG tablet Take 30 mg by mouth 3 times daily      sertraline (ZOLOFT) 100 MG tablet TAKE 3 TABLETS BY MOUTH ONCE DAILY      traZODone (DESYREL) 50 MG tablet Take 1 tablet by mouth 2 times daily 90 tablet 3    raNITIdine (ZANTAC) 75 MG tablet Take 1 tablet by mouth 2 times daily 180 tablet 3    valsartan (DIOVAN) 160 MG tablet Take 1 tablet by mouth daily 90 tablet 3    memantine (NAMENDA) 10 MG tablet Take half bid for one month than 1 bid 60 tablet 9    Calcium Carbonate-Vitamin D (CALCIUM-VITAMIN D) 600-125 MG-UNIT TABS Take by mouth      clonazePAM (KLONOPIN) 1 MG tablet Take by mouth 2 times daily. FLUoxetine (PROZAC) 40 MG capsule Take 1 capsule by mouth 2 times daily      loratadine-pseudoephedrine (CLARITIN-D 12HR) 5-120 MG per extended release tablet Take 1 tablet by mouth 2 times daily      naloxone 4 MG/0.1ML LIQD nasal spray Use 1 spray intranasally, then discard. Repeat with new spray every 2 min as needed for opioid overdose symptoms, alternating nostrils. No current facility-administered medications for this visit.      No Known Allergies  Patient Active Problem List   Diagnosis    Hypertension    Anxiety    Mild depression    Environmental and seasonal allergies    Depression    Insomnia    Obesity    Hypercholesterolemia    GERD (gastroesophageal reflux disease)    Obesity, morbid ([address])    Major depressive

## 2023-08-23 ENCOUNTER — OFFICE VISIT (OUTPATIENT)
Dept: FAMILY MEDICINE CLINIC | Facility: CLINIC | Age: 58
End: 2023-08-23
Payer: COMMERCIAL

## 2023-08-23 VITALS
WEIGHT: 279 LBS | HEIGHT: 69 IN | SYSTOLIC BLOOD PRESSURE: 130 MMHG | BODY MASS INDEX: 41.32 KG/M2 | DIASTOLIC BLOOD PRESSURE: 80 MMHG

## 2023-08-23 DIAGNOSIS — F41.1 GENERALIZED ANXIETY DISORDER: ICD-10-CM

## 2023-08-23 DIAGNOSIS — I10 PRIMARY HYPERTENSION: Primary | ICD-10-CM

## 2023-08-23 DIAGNOSIS — E66.01 MORBID (SEVERE) OBESITY DUE TO EXCESS CALORIES (HCC): ICD-10-CM

## 2023-08-23 PROCEDURE — 99214 OFFICE O/P EST MOD 30 MIN: CPT | Performed by: FAMILY MEDICINE

## 2023-08-23 PROCEDURE — 3079F DIAST BP 80-89 MM HG: CPT | Performed by: FAMILY MEDICINE

## 2023-08-23 PROCEDURE — 3075F SYST BP GE 130 - 139MM HG: CPT | Performed by: FAMILY MEDICINE

## 2023-08-23 ASSESSMENT — ENCOUNTER SYMPTOMS
SHORTNESS OF BREATH: 0
ABDOMINAL DISTENTION: 0
NAUSEA: 0
SINUS PAIN: 0
DIARRHEA: 0
ABDOMINAL PAIN: 0
COUGH: 0
VOMITING: 0
COLOR CHANGE: 0
APNEA: 0
RHINORRHEA: 0

## 2023-08-23 NOTE — PROGRESS NOTES
PROGRESS NOTE    SUBJECTIVE:   Jorden Schuster is a 62 y.o. female seen for a follow up visit regarding the following chief complaint:     Chief Complaint   Patient presents with    Blood Pressure Check           HPI patient presents office today for follow-up of low blood pressure and wants to know what she can do to lose weight      Past Medical History, Past Surgical History, Family history, Social History, and Medications were all reviewed with the patient today and updated as necessary. Current Outpatient Medications   Medication Sig Dispense Refill    busPIRone (BUSPAR) 30 MG tablet Take 30 mg by mouth 3 times daily      sertraline (ZOLOFT) 100 MG tablet TAKE 3 TABLETS BY MOUTH ONCE DAILY      traZODone (DESYREL) 50 MG tablet Take 1 tablet by mouth 2 times daily 90 tablet 3    raNITIdine (ZANTAC) 75 MG tablet Take 1 tablet by mouth 2 times daily 180 tablet 3    valsartan (DIOVAN) 160 MG tablet Take 1 tablet by mouth daily 90 tablet 3    memantine (NAMENDA) 10 MG tablet Take half bid for one month than 1 bid 60 tablet 9    Calcium Carbonate-Vitamin D (CALCIUM-VITAMIN D) 600-125 MG-UNIT TABS Take by mouth      clonazePAM (KLONOPIN) 1 MG tablet Take by mouth 2 times daily. FLUoxetine (PROZAC) 40 MG capsule Take 1 capsule by mouth 2 times daily      loratadine-pseudoephedrine (CLARITIN-D 12HR) 5-120 MG per extended release tablet Take 1 tablet by mouth 2 times daily      naloxone 4 MG/0.1ML LIQD nasal spray Use 1 spray intranasally, then discard. Repeat with new spray every 2 min as needed for opioid overdose symptoms, alternating nostrils. No current facility-administered medications for this visit.      No Known Allergies  Patient Active Problem List   Diagnosis    Hypertension    Anxiety    Mild depression    Environmental and seasonal allergies    Depression    Insomnia    Obesity    Hypercholesterolemia    GERD (gastroesophageal reflux disease)    Obesity, morbid ([address])    Major depressive

## 2024-04-05 RX ORDER — MEMANTINE HYDROCHLORIDE 10 MG/1
TABLET ORAL
Qty: 60 TABLET | Refills: 0 | Status: SHIPPED | OUTPATIENT
Start: 2024-04-05

## 2024-05-29 ENCOUNTER — OFFICE VISIT (OUTPATIENT)
Dept: NEUROLOGY | Age: 59
End: 2024-05-29
Payer: COMMERCIAL

## 2024-05-29 DIAGNOSIS — F03.B0 MODERATE DEMENTIA WITHOUT BEHAVIORAL DISTURBANCE, PSYCHOTIC DISTURBANCE, MOOD DISTURBANCE, OR ANXIETY, UNSPECIFIED DEMENTIA TYPE (HCC): Primary | ICD-10-CM

## 2024-05-29 PROCEDURE — 99214 OFFICE O/P EST MOD 30 MIN: CPT | Performed by: PSYCHIATRY & NEUROLOGY

## 2024-05-29 RX ORDER — DONEPEZIL HYDROCHLORIDE 5 MG/1
5 TABLET, FILM COATED ORAL NIGHTLY
Qty: 90 TABLET | Refills: 3 | Status: SHIPPED | OUTPATIENT
Start: 2024-05-29

## 2024-05-29 RX ORDER — MEMANTINE HYDROCHLORIDE 10 MG/1
10 TABLET ORAL 2 TIMES DAILY
Qty: 180 TABLET | Refills: 3 | Status: SHIPPED | OUTPATIENT
Start: 2024-05-29

## 2024-05-29 ASSESSMENT — ENCOUNTER SYMPTOMS
ALLERGIC/IMMUNOLOGIC NEGATIVE: 1
EYES NEGATIVE: 1
GASTROINTESTINAL NEGATIVE: 1
RESPIRATORY NEGATIVE: 1

## 2024-05-29 NOTE — PROGRESS NOTES
Lipids Mother     Drug Abuse Brother     Stroke Maternal Grandmother     Diabetes Maternal Grandmother     Muscular Dystrophy Maternal Uncle     Breast Cancer Maternal Aunt 52    Muscular Dystrophy Maternal Aunt     Cancer Maternal Aunt        Current Outpatient Medications on File Prior to Visit   Medication Sig Dispense Refill    memantine (NAMENDA) 10 MG tablet TAKE 1/2 (ONE-HALF) TABLET BY MOUTH TWICE DAILY FOR  1  MONTH  THEN  TAKE  1  TABLET  TWICE  DAILY 60 tablet 0    busPIRone (BUSPAR) 30 MG tablet Take 30 mg by mouth 3 times daily      sertraline (ZOLOFT) 100 MG tablet TAKE 3 TABLETS BY MOUTH ONCE DAILY      traZODone (DESYREL) 50 MG tablet Take 1 tablet by mouth 2 times daily 90 tablet 3    raNITIdine (ZANTAC) 75 MG tablet Take 1 tablet by mouth 2 times daily 180 tablet 3    valsartan (DIOVAN) 160 MG tablet Take 1 tablet by mouth daily 90 tablet 3    Calcium Carbonate-Vitamin D (CALCIUM-VITAMIN D) 600-125 MG-UNIT TABS Take by mouth      clonazePAM (KLONOPIN) 1 MG tablet Take by mouth 2 times daily.      FLUoxetine (PROZAC) 40 MG capsule Take 1 capsule by mouth 2 times daily      loratadine-pseudoephedrine (CLARITIN-D 12HR) 5-120 MG per extended release tablet Take 1 tablet by mouth 2 times daily      naloxone 4 MG/0.1ML LIQD nasal spray Use 1 spray intranasally, then discard. Repeat with new spray every 2 min as needed for opioid overdose symptoms, alternating nostrils.       No current facility-administered medications on file prior to visit.       No Known Allergies    Review of Systems:  Review of Systems   Constitutional: Negative.    HENT: Negative.     Eyes: Negative.    Respiratory: Negative.     Cardiovascular: Negative.    Gastrointestinal: Negative.    Endocrine: Negative.    Genitourinary: Negative.    Musculoskeletal: Negative.    Allergic/Immunologic: Negative.    Neurological:         Memory loss dementia   Hematological: Negative.       Failed to redirect to the Timeline version of the

## 2024-06-27 ENCOUNTER — NURSE ONLY (OUTPATIENT)
Dept: FAMILY MEDICINE CLINIC | Facility: CLINIC | Age: 59
End: 2024-06-27
Payer: COMMERCIAL

## 2024-06-27 DIAGNOSIS — E55.9 VITAMIN D DEFICIENCY: ICD-10-CM

## 2024-06-27 DIAGNOSIS — Z00.00 LABORATORY EXAMINATION ORDERED AS PART OF A ROUTINE GENERAL MEDICAL EXAMINATION: Primary | ICD-10-CM

## 2024-06-27 LAB
25(OH)D3 SERPL-MCNC: 39.5 NG/ML (ref 30–100)
ALBUMIN SERPL-MCNC: 3.9 G/DL (ref 3.5–5)
ALBUMIN/GLOB SERPL: 1.2 (ref 1–1.9)
ALP SERPL-CCNC: 94 U/L (ref 35–104)
ALT SERPL-CCNC: 26 U/L (ref 12–65)
ANION GAP SERPL CALC-SCNC: 10 MMOL/L (ref 9–18)
AST SERPL-CCNC: 30 U/L (ref 15–37)
BILIRUB SERPL-MCNC: 0.4 MG/DL (ref 0–1.2)
BILIRUBIN, URINE, POC: ABNORMAL
BLOOD URINE, POC: NEGATIVE
BUN SERPL-MCNC: 17 MG/DL (ref 6–23)
CALCIUM SERPL-MCNC: 9.3 MG/DL (ref 8.8–10.2)
CHLORIDE SERPL-SCNC: 102 MMOL/L (ref 98–107)
CHOLEST SERPL-MCNC: 187 MG/DL (ref 0–200)
CO2 SERPL-SCNC: 27 MMOL/L (ref 20–28)
CREAT SERPL-MCNC: 0.85 MG/DL (ref 0.6–1.1)
GLOBULIN SER CALC-MCNC: 3.4 G/DL (ref 2.3–3.5)
GLUCOSE SERPL-MCNC: 91 MG/DL (ref 70–99)
GLUCOSE URINE, POC: NEGATIVE
GRANS ABSOLUTE, POC: 5.2 K/UL
GRANULOCYTES %, POC: 55.2 %
HDLC SERPL-MCNC: 50 MG/DL (ref 40–60)
HDLC SERPL: 3.8 (ref 0–5)
HEMATOCRIT, POC: 39.3 %
HEMOGLOBIN, POC: 12.9 G/DL
KETONES, URINE, POC: NEGATIVE
LDLC SERPL CALC-MCNC: 124 MG/DL (ref 0–100)
LEUKOCYTE ESTERASE, URINE, POC: ABNORMAL
LYMPHOCYTE %, POC: 36.9 %
LYMPHS ABSOLUTE, POC: 3.5 K/UL
MCH, POC: NORMAL PG (ref 40–?)
MCHC, POC: 32.8
MCV, POC: 91.5
MONOCYTE %, POC: 7.9 %
MONOCYTE, ABSOLUTE POC: 0.8 K/UL
MPV, POC: 7 FL
NITRITE, URINE, POC: NEGATIVE
PH, URINE, POC: 5.5 (ref 4.6–8)
PLATELET COUNT, POC: 276 K/UL
POTASSIUM SERPL-SCNC: 3.6 MMOL/L (ref 3.5–5.1)
PROT SERPL-MCNC: 7.3 G/DL (ref 6.3–8.2)
PROTEIN,URINE, POC: NEGATIVE
RBC, POC: 4.29 M/UL
RDW, POC: 14.5 %
SODIUM SERPL-SCNC: 139 MMOL/L (ref 136–145)
SPECIFIC GRAVITY, URINE, POC: 1.02 (ref 1–1.03)
TRIGL SERPL-MCNC: 66 MG/DL (ref 0–150)
TSH, 3RD GENERATION: 3.61 UIU/ML (ref 0.27–4.2)
URINALYSIS CLARITY, POC: ABNORMAL
URINALYSIS COLOR, POC: ABNORMAL
UROBILINOGEN, POC: NORMAL
VLDLC SERPL CALC-MCNC: 13 MG/DL (ref 6–23)
WBC, POC: 9.5 K/UL

## 2024-06-27 PROCEDURE — 36415 COLL VENOUS BLD VENIPUNCTURE: CPT | Performed by: FAMILY MEDICINE

## 2024-06-27 PROCEDURE — 81003 URINALYSIS AUTO W/O SCOPE: CPT | Performed by: FAMILY MEDICINE

## 2024-06-27 PROCEDURE — 85025 COMPLETE CBC W/AUTO DIFF WBC: CPT | Performed by: FAMILY MEDICINE

## 2024-06-28 DIAGNOSIS — I10 PRIMARY HYPERTENSION: ICD-10-CM

## 2024-06-28 NOTE — TELEPHONE ENCOUNTER
We are having to move her follow up to sept 5. Can we send in enough to get her to that appointment?

## 2024-07-01 RX ORDER — VALSARTAN 160 MG/1
160 TABLET ORAL DAILY
Qty: 90 TABLET | Refills: 3 | Status: SHIPPED | OUTPATIENT
Start: 2024-07-01

## 2024-09-05 ENCOUNTER — OFFICE VISIT (OUTPATIENT)
Dept: FAMILY MEDICINE CLINIC | Facility: CLINIC | Age: 59
End: 2024-09-05
Payer: COMMERCIAL

## 2024-09-05 VITALS
HEIGHT: 69 IN | DIASTOLIC BLOOD PRESSURE: 80 MMHG | SYSTOLIC BLOOD PRESSURE: 118 MMHG | WEIGHT: 271 LBS | HEART RATE: 86 BPM | BODY MASS INDEX: 40.14 KG/M2

## 2024-09-05 DIAGNOSIS — F33.2 MAJOR DEPRESSIVE DISORDER, RECURRENT SEVERE WITHOUT PSYCHOTIC FEATURES (HCC): ICD-10-CM

## 2024-09-05 DIAGNOSIS — E66.01 MORBID (SEVERE) OBESITY DUE TO EXCESS CALORIES (HCC): ICD-10-CM

## 2024-09-05 DIAGNOSIS — E66.01 OBESITY, CLASS III, BMI 40-49.9 (MORBID OBESITY) (HCC): ICD-10-CM

## 2024-09-05 DIAGNOSIS — R41.3 MEMORY LOSS: ICD-10-CM

## 2024-09-05 DIAGNOSIS — G30.9 ALZHEIMER'S DISEASE, UNSPECIFIED (CODE) (HCC): ICD-10-CM

## 2024-09-05 DIAGNOSIS — I10 PRIMARY HYPERTENSION: ICD-10-CM

## 2024-09-05 DIAGNOSIS — E55.9 VITAMIN D DEFICIENCY: ICD-10-CM

## 2024-09-05 DIAGNOSIS — Z12.31 SCREENING MAMMOGRAM FOR BREAST CANCER: ICD-10-CM

## 2024-09-05 DIAGNOSIS — F32.A MILD DEPRESSION: ICD-10-CM

## 2024-09-05 DIAGNOSIS — F41.1 GENERALIZED ANXIETY DISORDER: ICD-10-CM

## 2024-09-05 DIAGNOSIS — E78.49 FAMILIAL HYPERLIPIDEMIA: ICD-10-CM

## 2024-09-05 DIAGNOSIS — Z13.31 SCREENING FOR DEPRESSION: ICD-10-CM

## 2024-09-05 DIAGNOSIS — Z00.00 ROUTINE GENERAL MEDICAL EXAMINATION AT A HEALTH CARE FACILITY: Primary | ICD-10-CM

## 2024-09-05 PROCEDURE — 3079F DIAST BP 80-89 MM HG: CPT | Performed by: FAMILY MEDICINE

## 2024-09-05 PROCEDURE — 99396 PREV VISIT EST AGE 40-64: CPT | Performed by: FAMILY MEDICINE

## 2024-09-05 PROCEDURE — 3074F SYST BP LT 130 MM HG: CPT | Performed by: FAMILY MEDICINE

## 2024-09-05 RX ORDER — TRAZODONE HYDROCHLORIDE 50 MG/1
50 TABLET, FILM COATED ORAL 2 TIMES DAILY
Qty: 90 TABLET | Refills: 3 | Status: SHIPPED | OUTPATIENT
Start: 2024-09-05

## 2024-09-05 RX ORDER — VALSARTAN 160 MG/1
160 TABLET ORAL DAILY
Qty: 90 TABLET | Refills: 3 | Status: SHIPPED | OUTPATIENT
Start: 2024-09-05

## 2024-09-05 RX ORDER — ZALEPLON 10 MG/1
CAPSULE ORAL
COMMUNITY
Start: 2024-09-02

## 2024-09-05 SDOH — ECONOMIC STABILITY: FOOD INSECURITY: WITHIN THE PAST 12 MONTHS, YOU WORRIED THAT YOUR FOOD WOULD RUN OUT BEFORE YOU GOT MONEY TO BUY MORE.: NEVER TRUE

## 2024-09-05 SDOH — ECONOMIC STABILITY: FOOD INSECURITY: WITHIN THE PAST 12 MONTHS, THE FOOD YOU BOUGHT JUST DIDN'T LAST AND YOU DIDN'T HAVE MONEY TO GET MORE.: NEVER TRUE

## 2024-09-05 SDOH — ECONOMIC STABILITY: INCOME INSECURITY: HOW HARD IS IT FOR YOU TO PAY FOR THE VERY BASICS LIKE FOOD, HOUSING, MEDICAL CARE, AND HEATING?: NOT HARD AT ALL

## 2024-09-05 ASSESSMENT — ENCOUNTER SYMPTOMS
ABDOMINAL PAIN: 0
COUGH: 0
SHORTNESS OF BREATH: 0

## 2024-09-05 ASSESSMENT — PATIENT HEALTH QUESTIONNAIRE - PHQ9: DEPRESSION UNABLE TO ASSESS: FUNCTIONAL CAPACITY MOTIVATION LIMITS ACCURACY

## 2024-09-05 NOTE — PROGRESS NOTES
PROGRESS NOTE    SUBJECTIVE:   Deborah Schuster is a 58 y.o. female seen for a follow up visit regarding the following chief complaint:     Chief Complaint   Patient presents with    Annual Exam     Medication and pharmacy refills confirmed.            HPI patient presents to the office today for complete physical without complaints      Past Medical History, Past Surgical History, Family history, Social History, and Medications were all reviewed with the patient today and updated as necessary.       Current Outpatient Medications   Medication Sig Dispense Refill    zaleplon (SONATA) 10 MG capsule       traZODone (DESYREL) 50 MG tablet Take 1 tablet by mouth 2 times daily 90 tablet 3    valsartan (DIOVAN) 160 MG tablet Take 1 tablet by mouth daily 90 tablet 3    donepezil (ARICEPT) 5 MG tablet Take 1 tablet by mouth nightly 90 tablet 3    memantine (NAMENDA) 10 MG tablet TAKE 1/2 (ONE-HALF) TABLET BY MOUTH TWICE DAILY FOR  1  MONTH  THEN  TAKE  1  TABLET  TWICE  DAILY 60 tablet 0    busPIRone (BUSPAR) 30 MG tablet Take 30 mg by mouth 3 times daily      sertraline (ZOLOFT) 100 MG tablet TAKE 3 TABLETS BY MOUTH ONCE DAILY      clonazePAM (KLONOPIN) 1 MG tablet Take by mouth 2 times daily.      FLUoxetine (PROZAC) 40 MG capsule Take 1 capsule by mouth 2 times daily      loratadine-pseudoephedrine (CLARITIN-D 12HR) 5-120 MG per extended release tablet Take 1 tablet by mouth 2 times daily      naloxone 4 MG/0.1ML LIQD nasal spray Use 1 spray intranasally, then discard. Repeat with new spray every 2 min as needed for opioid overdose symptoms, alternating nostrils.       No current facility-administered medications for this visit.     No Known Allergies  Patient Active Problem List   Diagnosis    Hypertension    Anxiety    Mild depression    Environmental and seasonal allergies    Depression    Insomnia    Obesity    Hypercholesterolemia    GERD (gastroesophageal reflux disease)    Obesity, morbid (HCC)    Major depressive

## 2024-10-28 ENCOUNTER — OFFICE VISIT (OUTPATIENT)
Dept: NEUROLOGY | Age: 59
End: 2024-10-28
Payer: COMMERCIAL

## 2024-10-28 VITALS
HEART RATE: 72 BPM | DIASTOLIC BLOOD PRESSURE: 82 MMHG | BODY MASS INDEX: 40.88 KG/M2 | WEIGHT: 276 LBS | SYSTOLIC BLOOD PRESSURE: 163 MMHG | OXYGEN SATURATION: 96 % | HEIGHT: 69 IN

## 2024-10-28 DIAGNOSIS — F03.B0 MODERATE DEMENTIA WITHOUT BEHAVIORAL DISTURBANCE, PSYCHOTIC DISTURBANCE, MOOD DISTURBANCE, OR ANXIETY, UNSPECIFIED DEMENTIA TYPE (HCC): Primary | ICD-10-CM

## 2024-10-28 DIAGNOSIS — F03.B0 MODERATE DEMENTIA WITHOUT BEHAVIORAL DISTURBANCE, PSYCHOTIC DISTURBANCE, MOOD DISTURBANCE, OR ANXIETY, UNSPECIFIED DEMENTIA TYPE (HCC): ICD-10-CM

## 2024-10-28 PROCEDURE — 3079F DIAST BP 80-89 MM HG: CPT | Performed by: PSYCHIATRY & NEUROLOGY

## 2024-10-28 PROCEDURE — 99214 OFFICE O/P EST MOD 30 MIN: CPT | Performed by: PSYCHIATRY & NEUROLOGY

## 2024-10-28 PROCEDURE — 3077F SYST BP >= 140 MM HG: CPT | Performed by: PSYCHIATRY & NEUROLOGY

## 2024-10-28 RX ORDER — DONEPEZIL HYDROCHLORIDE 10 MG/1
10 TABLET, FILM COATED ORAL NIGHTLY
Qty: 90 TABLET | Refills: 3 | Status: SHIPPED | OUTPATIENT
Start: 2024-10-28

## 2024-10-28 RX ORDER — MEMANTINE HYDROCHLORIDE 10 MG/1
TABLET ORAL
Qty: 180 TABLET | Refills: 3 | Status: SHIPPED | OUTPATIENT
Start: 2024-10-28

## 2024-10-28 ASSESSMENT — ENCOUNTER SYMPTOMS
ALLERGIC/IMMUNOLOGIC NEGATIVE: 1
GASTROINTESTINAL NEGATIVE: 1
EYES NEGATIVE: 1
RESPIRATORY NEGATIVE: 1

## 2024-10-28 NOTE — PROGRESS NOTES
Psychiatric/Behavioral: Negative.        Failed to redirect to the Timeline version of the "[employer] SmartLink.  Failed to redirect to the Timeline version of the "[employer] SmartLink.       Vitals:    10/28/24 0813   BP: (!) 163/82   Pulse: 72   SpO2: 96%   Weight: 125.2 kg (276 lb)   Height: 1.753 m (5' 9\")        Physical Exam  Constitutional:       Appearance: Normal appearance.   HENT:      Head: Normocephalic and atraumatic.   Eyes:      Extraocular Movements: Extraocular movements intact and EOM normal.      Pupils: Pupils are equal, round, and reactive to light.   Cardiovascular:      Rate and Rhythm: Normal rate.      Pulses: Normal pulses.   Pulmonary:      Effort: Pulmonary effort is normal.   Abdominal:      Palpations: Abdomen is soft.   Neurological:      Mental Status: She is alert.      Gait: Gait is intact.      Deep Tendon Reflexes:      Reflex Scores:       Tricep reflexes are 1+ on the right side and 1+ on the left side.       Bicep reflexes are 1+ on the right side and 1+ on the left side.       Brachioradialis reflexes are 1+ on the right side and 1+ on the left side.       Patellar reflexes are 1+ on the right side and 1+ on the left side.       Achilles reflexes are 1+ on the right side and 1+ on the left side.         Neurologic Exam     Mental Status   Attention: decreased. Concentration: decreased.   Level of consciousness: alert  Knowledge: poor.   The patient's MoCA test is about 15 out of 30 today.  Putting her again in the moderate range.  Send interestingly she takes the test and often has a very long delay.     Cranial Nerves     CN II   Visual fields full to confrontation.     CN III, IV, VI   Pupils are equal, round, and reactive to light.  Extraocular motions are normal.     CN VII   Facial expression full, symmetric.     Motor Exam   Right arm tone: normal  Left arm tone: normal  Right leg tone: normal  Left leg tone: normal    Gait, Coordination, and Reflexes     Gait  Gait:

## 2024-10-31 ENCOUNTER — CLINICAL DOCUMENTATION (OUTPATIENT)
Dept: NEUROLOGY | Age: 59
End: 2024-10-31

## 2024-10-31 LAB — IMMUNOLOGIST REVIEW: NORMAL

## 2024-11-01 LAB
Lab: NORMAL
Lab: POSITIVE
REFERENCE LAB: NORMAL

## 2024-12-05 ENCOUNTER — CLINICAL DOCUMENTATION (OUTPATIENT)
Dept: NEUROLOGY | Age: 59
End: 2024-12-05

## 2024-12-05 NOTE — PROGRESS NOTES
The patient has diarrhea and so we likely had explained to him in the office to stop the Aricept if there is any GI discomfort.

## 2025-01-27 RX ORDER — MEMANTINE HYDROCHLORIDE 10 MG/1
TABLET ORAL
Qty: 180 TABLET | Refills: 3 | Status: SHIPPED | OUTPATIENT
Start: 2025-01-27

## 2025-02-14 DIAGNOSIS — E78.00 HYPERCHOLESTEROLEMIA: Primary | ICD-10-CM

## 2025-02-24 ENCOUNTER — TELEPHONE (OUTPATIENT)
Dept: FAMILY MEDICINE CLINIC | Facility: CLINIC | Age: 60
End: 2025-02-24

## 2025-02-24 ENCOUNTER — LAB (OUTPATIENT)
Dept: FAMILY MEDICINE CLINIC | Facility: CLINIC | Age: 60
End: 2025-02-24

## 2025-02-24 ENCOUNTER — OFFICE VISIT (OUTPATIENT)
Dept: FAMILY MEDICINE CLINIC | Facility: CLINIC | Age: 60
End: 2025-02-24
Payer: COMMERCIAL

## 2025-02-24 VITALS
HEIGHT: 69 IN | DIASTOLIC BLOOD PRESSURE: 82 MMHG | WEIGHT: 292 LBS | BODY MASS INDEX: 43.25 KG/M2 | SYSTOLIC BLOOD PRESSURE: 152 MMHG | HEART RATE: 64 BPM

## 2025-02-24 DIAGNOSIS — E78.00 HYPERCHOLESTEROLEMIA: ICD-10-CM

## 2025-02-24 DIAGNOSIS — M25.552 CHRONIC LEFT HIP PAIN: Primary | ICD-10-CM

## 2025-02-24 DIAGNOSIS — G89.29 CHRONIC LEFT HIP PAIN: Primary | ICD-10-CM

## 2025-02-24 LAB
ALBUMIN SERPL-MCNC: 3.6 G/DL (ref 3.5–5)
ALBUMIN/GLOB SERPL: 1.1 (ref 1–1.9)
ALP SERPL-CCNC: 92 U/L (ref 35–104)
ALT SERPL-CCNC: 29 U/L (ref 8–45)
ANION GAP SERPL CALC-SCNC: 12 MMOL/L (ref 7–16)
AST SERPL-CCNC: 29 U/L (ref 15–37)
BILIRUB SERPL-MCNC: 0.4 MG/DL (ref 0–1.2)
BUN SERPL-MCNC: 11 MG/DL (ref 6–23)
CALCIUM SERPL-MCNC: 9.5 MG/DL (ref 8.8–10.2)
CHLORIDE SERPL-SCNC: 105 MMOL/L (ref 98–107)
CHOLEST SERPL-MCNC: 201 MG/DL (ref 0–200)
CO2 SERPL-SCNC: 27 MMOL/L (ref 20–29)
CREAT SERPL-MCNC: 0.79 MG/DL (ref 0.6–1.1)
GLOBULIN SER CALC-MCNC: 3.3 G/DL (ref 2.3–3.5)
GLUCOSE SERPL-MCNC: 138 MG/DL (ref 70–99)
HDLC SERPL-MCNC: 40 MG/DL (ref 40–60)
HDLC SERPL: 5.1 (ref 0–5)
LDLC SERPL CALC-MCNC: 137 MG/DL (ref 0–100)
POTASSIUM SERPL-SCNC: 3.9 MMOL/L (ref 3.5–5.1)
PROT SERPL-MCNC: 7 G/DL (ref 6.3–8.2)
SODIUM SERPL-SCNC: 144 MMOL/L (ref 136–145)
TRIGL SERPL-MCNC: 124 MG/DL (ref 0–150)
VLDLC SERPL CALC-MCNC: 25 MG/DL (ref 6–23)

## 2025-02-24 PROCEDURE — 99213 OFFICE O/P EST LOW 20 MIN: CPT | Performed by: FAMILY MEDICINE

## 2025-02-24 PROCEDURE — 3079F DIAST BP 80-89 MM HG: CPT | Performed by: FAMILY MEDICINE

## 2025-02-24 PROCEDURE — 3077F SYST BP >= 140 MM HG: CPT | Performed by: FAMILY MEDICINE

## 2025-02-24 RX ORDER — NAPROXEN 500 MG/1
500 TABLET ORAL 2 TIMES DAILY WITH MEALS
Qty: 60 TABLET | Refills: 3 | Status: SHIPPED | OUTPATIENT
Start: 2025-02-24

## 2025-02-24 SDOH — ECONOMIC STABILITY: FOOD INSECURITY: WITHIN THE PAST 12 MONTHS, THE FOOD YOU BOUGHT JUST DIDN'T LAST AND YOU DIDN'T HAVE MONEY TO GET MORE.: NEVER TRUE

## 2025-02-24 SDOH — ECONOMIC STABILITY: FOOD INSECURITY: WITHIN THE PAST 12 MONTHS, YOU WORRIED THAT YOUR FOOD WOULD RUN OUT BEFORE YOU GOT MONEY TO BUY MORE.: NEVER TRUE

## 2025-02-24 ASSESSMENT — PATIENT HEALTH QUESTIONNAIRE - PHQ9: DEPRESSION UNABLE TO ASSESS: FUNCTIONAL CAPACITY MOTIVATION LIMITS ACCURACY

## 2025-02-24 ASSESSMENT — ENCOUNTER SYMPTOMS
SHORTNESS OF BREATH: 0
NAUSEA: 0
VOMITING: 0

## 2025-02-24 NOTE — PROGRESS NOTES
PROGRESS NOTE    SUBJECTIVE:   Deborah Schuster is a 59 y.o. female seen for a follow up visit regarding the following chief complaint:     Chief Complaint   Patient presents with    Leg Pain                  HPI patient presents the office complaining of left hip pain denies any trauma states has been going on for months now      Past Medical History, Past Surgical History, Family history, Social History, and Medications were all reviewed with the patient today and updated as necessary.       Current Outpatient Medications   Medication Sig Dispense Refill    naproxen (NAPROSYN) 500 MG tablet Take 1 tablet by mouth 2 times daily (with meals) 60 tablet 3    memantine (NAMENDA) 10 MG tablet TAKE   1  TABLET  TWICE  DAILY 180 tablet 3    donepezil (ARICEPT) 10 MG tablet Take 1 tablet by mouth nightly 90 tablet 3    zaleplon (SONATA) 10 MG capsule       traZODone (DESYREL) 50 MG tablet Take 1 tablet by mouth 2 times daily 90 tablet 3    valsartan (DIOVAN) 160 MG tablet Take 1 tablet by mouth daily 90 tablet 3    busPIRone (BUSPAR) 30 MG tablet Take 30 mg by mouth 3 times daily      sertraline (ZOLOFT) 100 MG tablet TAKE 3 TABLETS BY MOUTH ONCE DAILY      clonazePAM (KLONOPIN) 1 MG tablet Take by mouth 2 times daily.      FLUoxetine (PROZAC) 40 MG capsule Take 1 capsule by mouth 2 times daily      loratadine-pseudoephedrine (CLARITIN-D 12HR) 5-120 MG per extended release tablet Take 1 tablet by mouth 2 times daily      naloxone 4 MG/0.1ML LIQD nasal spray Use 1 spray intranasally, then discard. Repeat with new spray every 2 min as needed for opioid overdose symptoms, alternating nostrils.       No current facility-administered medications for this visit.     No Known Allergies  Patient Active Problem List   Diagnosis    Hypertension    Anxiety    Mild depression    Environmental and seasonal allergies    Depression    Insomnia    Obesity    Hypercholesterolemia    GERD (gastroesophageal reflux disease)    Obesity, morbid

## 2025-02-24 NOTE — TELEPHONE ENCOUNTER
Per dr. Tai notify xray show no facture. Take naproxen and go to PT for pain.  Gabriel notified and verbalized understanding.

## 2025-03-07 ENCOUNTER — TELEMEDICINE (OUTPATIENT)
Dept: FAMILY MEDICINE CLINIC | Facility: CLINIC | Age: 60
End: 2025-03-07

## 2025-03-07 DIAGNOSIS — E78.49 FAMILIAL HYPERLIPIDEMIA: Primary | ICD-10-CM

## 2025-03-07 RX ORDER — ATORVASTATIN CALCIUM 10 MG/1
10 TABLET, FILM COATED ORAL DAILY
Qty: 90 TABLET | Refills: 3 | Status: SHIPPED | OUTPATIENT
Start: 2025-03-07

## 2025-03-07 ASSESSMENT — PATIENT HEALTH QUESTIONNAIRE - PHQ9
7. TROUBLE CONCENTRATING ON THINGS, SUCH AS READING THE NEWSPAPER OR WATCHING TELEVISION: NOT AT ALL
10. IF YOU CHECKED OFF ANY PROBLEMS, HOW DIFFICULT HAVE THESE PROBLEMS MADE IT FOR YOU TO DO YOUR WORK, TAKE CARE OF THINGS AT HOME, OR GET ALONG WITH OTHER PEOPLE: NOT DIFFICULT AT ALL
5. POOR APPETITE OR OVEREATING: NOT AT ALL
1. LITTLE INTEREST OR PLEASURE IN DOING THINGS: NOT AT ALL
SUM OF ALL RESPONSES TO PHQ QUESTIONS 1-9: 0
8. MOVING OR SPEAKING SO SLOWLY THAT OTHER PEOPLE COULD HAVE NOTICED. OR THE OPPOSITE, BEING SO FIGETY OR RESTLESS THAT YOU HAVE BEEN MOVING AROUND A LOT MORE THAN USUAL: NOT AT ALL
SUM OF ALL RESPONSES TO PHQ QUESTIONS 1-9: 0
9. THOUGHTS THAT YOU WOULD BE BETTER OFF DEAD, OR OF HURTING YOURSELF: NOT AT ALL
SUM OF ALL RESPONSES TO PHQ QUESTIONS 1-9: 0
4. FEELING TIRED OR HAVING LITTLE ENERGY: NOT AT ALL
SUM OF ALL RESPONSES TO PHQ QUESTIONS 1-9: 0
6. FEELING BAD ABOUT YOURSELF - OR THAT YOU ARE A FAILURE OR HAVE LET YOURSELF OR YOUR FAMILY DOWN: NOT AT ALL
3. TROUBLE FALLING OR STAYING ASLEEP: NOT AT ALL
2. FEELING DOWN, DEPRESSED OR HOPELESS: NOT AT ALL

## 2025-03-07 ASSESSMENT — ENCOUNTER SYMPTOMS
SHORTNESS OF BREATH: 0
NAUSEA: 0
VOMITING: 0

## 2025-03-07 NOTE — PROGRESS NOTES
PROGRESS NOTE    SUBJECTIVE:   Deborah Schuster is a 59 y.o. female seen for a follow up visit regarding the following chief complaint:     Chief Complaint   Patient presents with    Follow-up           HPI patient doing a phone call visit to go over her lab results without any new complaintsDeborah Schuster is a 59 y.o. female evaluated via telephone on 3/7/2025 for Follow-up  .        Total Time: minutes: 11-20 minutes    Deborah Schuster was evaluated through a synchronous (real-time) audio encounter. Patient identification was verified at the start of the visit. She (or guardian if applicable) is aware that this is a billable service, which includes applicable co-pays. This visit was conducted with the patient's (and/or legal guardian's) verbal consent. She has not had a related appointment within my department in the past 7 days or scheduled within the next 24 hours.   The patient was located at Home: [address] [place] [id number].  The provider was located at Facility (Appt Dept): [address] [place] [address] [place] [address].    Note: not billable if this call serves to triage the patient into an appointment for the relevant concern         Past Medical History, Past Surgical History, Family history, Social History, and Medications were all reviewed with the patient today and updated as necessary.       Current Outpatient Medications   Medication Sig Dispense Refill    atorvastatin (LIPITOR) 10 MG tablet Take 1 tablet by mouth daily 90 tablet 3    naproxen (NAPROSYN) 500 MG tablet Take 1 tablet by mouth 2 times daily (with meals) 60 tablet 3    memantine (NAMENDA) 10 MG tablet TAKE   1  TABLET  TWICE  DAILY 180 tablet 3    donepezil (ARICEPT) 10 MG tablet Take 1 tablet by mouth nightly 90 tablet 3    zaleplon (SONATA) 10 MG capsule       traZODone (DESYREL) 50 MG tablet Take 1 tablet by mouth 2 times daily 90 tablet 3    valsartan (DIOVAN) 160 MG tablet Take 1 tablet by mouth daily 90 tablet 3

## 2025-03-20 ENCOUNTER — HOSPITAL ENCOUNTER (OUTPATIENT)
Dept: PHYSICAL THERAPY | Age: 60
Setting detail: RECURRING SERIES
Discharge: HOME | End: 2025-03-23
Attending: FAMILY MEDICINE
Payer: MEDICARE

## 2025-03-20 DIAGNOSIS — M25.552 PAIN IN LEFT HIP: Primary | ICD-10-CM

## 2025-03-20 DIAGNOSIS — M25.652 STIFFNESS OF LEFT HIP, NOT ELSEWHERE CLASSIFIED: ICD-10-CM

## 2025-03-20 PROCEDURE — 97110 THERAPEUTIC EXERCISES: CPT

## 2025-03-20 PROCEDURE — 97161 PT EVAL LOW COMPLEX 20 MIN: CPT

## 2025-03-20 ASSESSMENT — PAIN SCALES - GENERAL: PAINLEVEL_OUTOF10: 6

## 2025-03-20 NOTE — THERAPY EVALUATION
Deborah Schuster  : 1965  Primary: [employer] Medicare-advantage Ppo (Medicare Managed)  Secondary:  [facility] [place]  [address]  [facility] [address]  Phone: [phone]  Fax: [phone] Plan Frequency: 2 times a week  Plan of Care/Certification Expiration Date: 25        Plan of Care/Certification Expiration Date:  Plan of Care/Certification Expiration Date: 25    Frequency/Duration: Plan Frequency: 2 times a week      Time In/Out:   Time In: 1230  Time Out: 1330      PT Visit Info:    Progress Note Counter: 1      Visit Count:  1                OUTPATIENT PHYSICAL THERAPY:             Initial Assessment 3/20/2025               Episode (L hip pain)         Treatment Diagnosis:     Pain in left hip  Stiffness of left hip, not elsewhere classified  Medical/Referring Diagnosis:    Chronic left hip pain [M25.552, G89.29]    Referring Physician:  Stefan Tai DO MD Orders:  PT Eval and Treat   Return MD Appt:  NA  Date of Onset:  Onset Date:  (Chronic: 3 months ago)     Allergies:  Patient has no known allergies.  Restrictions/Precautions:    None      Medications Last Reviewed: 3/20/2025     SUBJECTIVE   History of Injury/Illness (Reason for Referral):  Patient presents to therapy with her mother.  Patient is a poor historian so information gathered is from both mother and patient.  They report that the patient has been dealing with L hip pain for several months now and can't recall any incident nor episode that initiated the pain.  Patient pinpoints most of her pain to be occurring throughout her L groin area and states that the pain is excruciating enough to limit her tolerance of standing and walking to no more than 5 minutes.  Rates the pain at rest a 5/10 and with increased walking and standing it increases to 8-9/10.  Can't figure out any kind of intervention that alleviates her symptoms.  The pain is so severe that she requires a

## 2025-03-21 NOTE — PROGRESS NOTES
Deborah Schuster  : 1965  Primary: [employer] Medicare-advantage Ppo (Medicare Managed)  Secondary:  [facility] [place]  [address]  [facility] [address]  Phone: [phone]  Fax: [phone] Plan Frequency: 2 times a week    Plan of Care/Certification Expiration Date: 25        Plan of Care/Certification Expiration Date:  Plan of Care/Certification Expiration Date: 25    Frequency/Duration: Plan Frequency: 2 times a week      Time In/Out:   Time In: 1230  Time Out: 1330      PT Visit Info:    Progress Note Counter: 1      Visit Count:  1    OUTPATIENT PHYSICAL THERAPY:   Treatment Note 3/20/2025       Episode  (L hip pain)               Treatment Diagnosis:    Pain in left hip  Stiffness of left hip, not elsewhere classified  Medical/Referring Diagnosis:    Chronic left hip pain [M25.552, G89.29]    Referring Physician:  Stefan Tai DO MD Orders:  PT Eval and Treat   Return MD Appt:  NA   Date of Onset:  Onset Date:  (Chronic: 3 months ago)     Allergies:   Patient has no known allergies.  Restrictions/Precautions:   None      Interventions Planned (Treatment may consist of any combination of the following):     See Assessment Note    Subjective Comments:   Patient presents to therapy with severe L hip pain that has been going on for at least 3 months now.    Initial Pain Level:     6/10  Post Session Pain Level:      6/10  Medications Last Reviewed: 3/20/2025  Updated Objective Findings:  See Evaluation Note from today  Treatment   THERAPEUTIC EXERCISE: (15 minutes):    Exercises per grid below to improve mobility, strength, and balance.  Required maximal visual, verbal, manual, and tactile cues to promote proper body alignment, promote proper body posture, and promote proper body mechanics.  Progressed resistance, range, and repetitions as indicated.   Date:  3/20/25 Date:   Date:     Activity/Exercise Parameters Parameters Parameters   HEP

## 2025-03-26 ENCOUNTER — HOSPITAL ENCOUNTER (OUTPATIENT)
Dept: PHYSICAL THERAPY | Age: 60
Setting detail: RECURRING SERIES
Discharge: HOME | End: 2025-03-29
Attending: FAMILY MEDICINE
Payer: MEDICARE

## 2025-03-26 PROCEDURE — 97140 MANUAL THERAPY 1/> REGIONS: CPT

## 2025-03-26 PROCEDURE — 97110 THERAPEUTIC EXERCISES: CPT

## 2025-03-26 ASSESSMENT — PAIN SCALES - GENERAL: PAINLEVEL_OUTOF10: 3

## 2025-03-26 NOTE — PROGRESS NOTES
Deborah Schuster  : 1965  Primary: [employer] Medicare-advantage Ppo (Medicare Managed)  Secondary:  [facility] [place]  [address]  [facility] [address]  Phone: [phone]  Fax: [phone] Plan Frequency: 2 times a week    Plan of Care/Certification Expiration Date: 25        Plan of Care/Certification Expiration Date:  Plan of Care/Certification Expiration Date: 25    Frequency/Duration: Plan Frequency: 2 times a week      Time In/Out:   Time In: 1058  Time Out: 1155      PT Visit Info:    Progress Note Counter: 2      Visit Count:  2    OUTPATIENT PHYSICAL THERAPY:   Treatment Note 3/26/2025       Episode  (L hip pain)               Treatment Diagnosis:    Pain in left hip  Stiffness of left hip, not elsewhere classified  Medical/Referring Diagnosis:    Chronic left hip pain [M25.552, G89.29]    Referring Physician:  Stefan Tai DO MD Orders:  PT Eval and Treat   Return MD Appt:  NA   Date of Onset:  Onset Date:  (Chronic: 3 months ago)     Allergies:   Patient has no known allergies.  Restrictions/Precautions:   None      Interventions Planned (Treatment may consist of any combination of the following):     See Assessment Note    Subjective Comments:   Patient and mother report that they have been doing her HEP pretty consistently as of recently and feels like things are starting to get easier.    Initial Pain Level:     3/10  Post Session Pain Level:      3/10  Medications Last Reviewed: 3/26/2025  Updated Objective Findings:   Demonstrates increased weightbearing while ambulating throughout LLE  Treatment   THERAPEUTIC EXERCISE: (40 minutes):    Exercises per grid below to improve mobility, strength, and balance.  Required maximal visual, verbal, manual, and tactile cues to promote proper body alignment, promote proper body posture, and promote proper body mechanics.  Progressed resistance, range, and repetitions as indicated.

## 2025-04-01 ENCOUNTER — OFFICE VISIT (OUTPATIENT)
Dept: NEUROLOGY | Age: 60
End: 2025-04-01
Payer: MEDICARE

## 2025-04-01 ENCOUNTER — HOSPITAL ENCOUNTER (OUTPATIENT)
Dept: PHYSICAL THERAPY | Age: 60
Setting detail: RECURRING SERIES
Discharge: HOME | End: 2025-04-04
Attending: FAMILY MEDICINE
Payer: MEDICARE

## 2025-04-01 VITALS — HEIGHT: 69 IN | BODY MASS INDEX: 43.12 KG/M2

## 2025-04-01 DIAGNOSIS — G30.8 MODERATE ALZHEIMER'S DEMENTIA OF OTHER ONSET WITHOUT BEHAVIORAL DISTURBANCE, PSYCHOTIC DISTURBANCE, MOOD DISTURBANCE, OR ANXIETY: Primary | ICD-10-CM

## 2025-04-01 DIAGNOSIS — F02.B0 MODERATE ALZHEIMER'S DEMENTIA OF OTHER ONSET WITHOUT BEHAVIORAL DISTURBANCE, PSYCHOTIC DISTURBANCE, MOOD DISTURBANCE, OR ANXIETY: Primary | ICD-10-CM

## 2025-04-01 PROCEDURE — 97140 MANUAL THERAPY 1/> REGIONS: CPT

## 2025-04-01 PROCEDURE — 99214 OFFICE O/P EST MOD 30 MIN: CPT | Performed by: PSYCHIATRY & NEUROLOGY

## 2025-04-01 PROCEDURE — 97110 THERAPEUTIC EXERCISES: CPT

## 2025-04-01 RX ORDER — RIVASTIGMINE TARTRATE 1.5 MG/1
1.5 CAPSULE ORAL 2 TIMES DAILY
Qty: 10 CAPSULE | Refills: 3 | Status: SHIPPED | OUTPATIENT
Start: 2025-04-01

## 2025-04-01 ASSESSMENT — ENCOUNTER SYMPTOMS
EYES NEGATIVE: 1
ALLERGIC/IMMUNOLOGIC NEGATIVE: 1
RESPIRATORY NEGATIVE: 1
GASTROINTESTINAL NEGATIVE: 1

## 2025-04-01 NOTE — PROGRESS NOTES
Deborah Schuster  : 1965  Primary: [employer] Medicare-advantage Ppo (Medicare Managed)  Secondary:  [facility] [place]  [address]  [facility] [address]  Phone: [phone]  Fax: [phone] Plan Frequency: 2 times a week    Plan of Care/Certification Expiration Date: 25        Plan of Care/Certification Expiration Date:  Plan of Care/Certification Expiration Date: 25    Frequency/Duration: Plan Frequency: 2 times a week      Time In/Out:   Time In: 1230  Time Out: 1330      PT Visit Info:    Progress Note Counter: 3      Visit Count:  3    OUTPATIENT PHYSICAL THERAPY:   Treatment Note 2025       Episode  (L hip pain)               Treatment Diagnosis:    Pain in left hip  Stiffness of left hip, not elsewhere classified  Medical/Referring Diagnosis:    Chronic left hip pain [M25.552, G89.29]    Referring Physician:  Stefan Tai DO MD Orders:  PT Eval and Treat   Return MD Appt:  NA   Date of Onset:  Onset Date:  (Chronic: 3 months ago)     Allergies:   Patient has no known allergies.  Restrictions/Precautions:   None      Interventions Planned (Treatment may consist of any combination of the following):     See Assessment Note    Subjective Comments:   Patient and mother reports that she is doing good, has been moving a little easier however continues to have pain with prolonged walking and standing.    Initial Pain Level:     2/10  Post Session Pain Level:      2/10  Medications Last Reviewed: 2025  Updated Objective Findings:   Demonstrates increased weightbearing while ambulating throughout LLE  Treatment   THERAPEUTIC EXERCISE: (40 minutes):    Exercises per grid below to improve mobility, strength, and balance.  Required maximal visual, verbal, manual, and tactile cues to promote proper body alignment, promote proper body posture, and promote proper body mechanics.  Progressed resistance, range, and repetitions as indicated.

## 2025-04-01 NOTE — PROGRESS NOTES
[facility]  [address] [place] [address], [address]  [place] [address]  Phone: [phone] Fax [phone]  Dr. Fredis Lord      4/1/2025  Deborahjalil Schuster     Patient is referred by the following provider for consultation regarding as below:       I reviewed the available records and notes and have examined patient with the following findings:     Chief Complaint:  Chief Complaint   Patient presents with    Follow-up          HPI: This is a right handed 59 y.o.  female.  Here with her .  This patient is very pleasant but around 2018 she started developing short-term memory loss.  It was very frustrating to figure out this and she is progressively gotten a little worse.  Unfortunately her MoCA test is very limited because of the significant anxiety.  At 1 point feel most accurate I could find would be a MoCA test of about 12-13 out of 30.  We do have her on Namenda 10 twice a day Aricept unfortunately she could not tolerate.  We did an FDG PET scan that was positive quite sometime ago but that is not very accurate.  We then did a P-tau 217 and 181 which came back significantly elevated which is consistent with a diagnosis of Alzheimer's type dementia.  She is progressively getting a little worse.  She could not tolerate Aricept due to GI side effects.  We did have a lengthy conversation about the infusions.  As mentioned below .  They are going to look them up on unfortunately we would probably need a spinal tap in order to use the and she seems very reluctant.    IMAGING REVIEW:  I REVIEWED PERTINENT  IMAGES AND REPORTS WITH THE PATIENT PERSONALLY, DIRECTLY AND FULLY.     Past Medical History:  Past Medical History:   Diagnosis Date    Anxiety     Depression     Environmental and seasonal allergies     GERD (gastroesophageal reflux disease)     Hypercholesterolemia     Hypertension     Poor historian        Past Surgical History:  Past Surgical History:   Procedure Laterality Date

## 2025-04-02 ASSESSMENT — PAIN SCALES - GENERAL: PAINLEVEL_OUTOF10: 2

## 2025-04-03 ENCOUNTER — HOSPITAL ENCOUNTER (OUTPATIENT)
Dept: PHYSICAL THERAPY | Age: 60
Setting detail: RECURRING SERIES
Discharge: HOME | End: 2025-04-06
Attending: FAMILY MEDICINE
Payer: MEDICARE

## 2025-04-03 PROCEDURE — 97140 MANUAL THERAPY 1/> REGIONS: CPT

## 2025-04-03 PROCEDURE — 97110 THERAPEUTIC EXERCISES: CPT

## 2025-04-04 ASSESSMENT — PAIN SCALES - GENERAL: PAINLEVEL_OUTOF10: 2

## 2025-04-04 NOTE — PROGRESS NOTES
Deborah Schuster  : 1965  Primary: [employer] Medicare-advantage Ppo (Medicare Managed)  Secondary:  [facility] [place]  [address]  [facility] [address]  Phone: [phone]  Fax: [phone] Plan Frequency: 2 times a week    Plan of Care/Certification Expiration Date: 25        Plan of Care/Certification Expiration Date:  Plan of Care/Certification Expiration Date: 25    Frequency/Duration: Plan Frequency: 2 times a week      Time In/Out:   Time In: 1100  Time Out: 1156      PT Visit Info:    Progress Note Counter: 4      Visit Count:  4    OUTPATIENT PHYSICAL THERAPY:   Treatment Note 4/3/2025       Episode  (L hip pain)               Treatment Diagnosis:    Pain in left hip  Stiffness of left hip, not elsewhere classified  Medical/Referring Diagnosis:    Chronic left hip pain [M25.552, G89.29]    Referring Physician:  Stefan Tai DO MD Orders:  PT Eval and Treat   Return MD Appt:  NA   Date of Onset:  Onset Date:  (Chronic: 3 months ago)     Allergies:   Patient has no known allergies.  Restrictions/Precautions:   None      Interventions Planned (Treatment may consist of any combination of the following):     See Assessment Note    Subjective Comments:   Patient and mother reports that she actually felt pretty good following our previous session, wants that feeling to last her a bit longer as well.      Initial Pain Level:     2/10  Post Session Pain Level:      2/10  Medications Last Reviewed: 4/3/2025  Updated Objective Findings:  None Today  Treatment   THERAPEUTIC EXERCISE: (40 minutes):    Exercises per grid below to improve mobility, strength, and balance.  Required maximal visual, verbal, manual, and tactile cues to promote proper body alignment, promote proper body posture, and promote proper body mechanics.  Progressed resistance, range, and repetitions as indicated.   Date:   4/3/25 Date:  3/26/25 Date:  25   Activity/Exercise

## 2025-04-07 RX ORDER — RIVASTIGMINE TARTRATE 1.5 MG/1
1.5 CAPSULE ORAL 2 TIMES DAILY
Qty: 60 CAPSULE | Refills: 5 | Status: SHIPPED | OUTPATIENT
Start: 2025-04-07 | End: 2025-05-07

## 2025-04-08 ENCOUNTER — APPOINTMENT (OUTPATIENT)
Dept: PHYSICAL THERAPY | Age: 60
End: 2025-04-08
Attending: FAMILY MEDICINE
Payer: MEDICARE

## 2025-04-08 ENCOUNTER — OFFICE VISIT (OUTPATIENT)
Dept: FAMILY MEDICINE CLINIC | Facility: CLINIC | Age: 60
End: 2025-04-08
Payer: MEDICARE

## 2025-04-08 VITALS
OXYGEN SATURATION: 95 % | HEART RATE: 77 BPM | WEIGHT: 288 LBS | BODY MASS INDEX: 42.65 KG/M2 | DIASTOLIC BLOOD PRESSURE: 82 MMHG | SYSTOLIC BLOOD PRESSURE: 140 MMHG | HEIGHT: 69 IN

## 2025-04-08 DIAGNOSIS — J02.9 SORE THROAT: Primary | ICD-10-CM

## 2025-04-08 DIAGNOSIS — J30.89 ENVIRONMENTAL AND SEASONAL ALLERGIES: ICD-10-CM

## 2025-04-08 DIAGNOSIS — R68.89 FLU-LIKE SYMPTOMS: ICD-10-CM

## 2025-04-08 LAB
EXP DATE SOLUTION: NORMAL
EXP DATE SWAB: NORMAL
EXPIRATION DATE: NORMAL
GRANS ABSOLUTE, POC: 4.9 K/UL
GRANULOCYTES %, POC: 65.8 %
GROUP A STREP ANTIGEN, POC: NEGATIVE
HEMATOCRIT, POC: 40.1 %
HEMOGLOBIN, POC: 12.7 G/DL
INFLUENZA A ANTIGEN, POC: NEGATIVE
INFLUENZA B ANTIGEN, POC: NEGATIVE
LOT NUMBER POC: NORMAL
LOT NUMBER SOLUTION: NORMAL
LOT NUMBER SWAB: NORMAL
LYMPHOCYTE %, POC: 26.7 %
LYMPHS ABSOLUTE, POC: 2 K/UL
MCH, POC: 28 PG (ref 40–?)
MCHC, POC: 31.7
MCV, POC: 89
MONOCYTE %, POC: 7.5 %
MONOCYTE, ABSOLUTE POC: 0.6 K/UL
MPV, POC: 7.4 FL
PLATELET COUNT, POC: 274 K/UL
RBC, POC: 4.5 M/UL
RDW, POC: 14 %
SARS-COV-2 RNA, POC: NEGATIVE
VALID INTERNAL CONTROL, POC: YES
VALID INTERNAL CONTROL, POC: YES
WBC, POC: 7.5 K/UL

## 2025-04-08 PROCEDURE — 87635 SARS-COV-2 COVID-19 AMP PRB: CPT | Performed by: FAMILY MEDICINE

## 2025-04-08 PROCEDURE — 87880 STREP A ASSAY W/OPTIC: CPT | Performed by: FAMILY MEDICINE

## 2025-04-08 PROCEDURE — 96372 THER/PROPH/DIAG INJ SC/IM: CPT | Performed by: FAMILY MEDICINE

## 2025-04-08 PROCEDURE — 3079F DIAST BP 80-89 MM HG: CPT | Performed by: FAMILY MEDICINE

## 2025-04-08 PROCEDURE — 87804 INFLUENZA ASSAY W/OPTIC: CPT | Performed by: FAMILY MEDICINE

## 2025-04-08 PROCEDURE — 3077F SYST BP >= 140 MM HG: CPT | Performed by: FAMILY MEDICINE

## 2025-04-08 PROCEDURE — 99213 OFFICE O/P EST LOW 20 MIN: CPT | Performed by: FAMILY MEDICINE

## 2025-04-08 PROCEDURE — 85025 COMPLETE CBC W/AUTO DIFF WBC: CPT | Performed by: FAMILY MEDICINE

## 2025-04-08 RX ORDER — MONTELUKAST SODIUM 10 MG/1
10 TABLET ORAL DAILY
Qty: 30 TABLET | Refills: 11 | Status: SHIPPED | OUTPATIENT
Start: 2025-04-08 | End: 2025-05-08

## 2025-04-08 RX ORDER — TRIAMCINOLONE ACETONIDE 40 MG/ML
40 INJECTION, SUSPENSION INTRA-ARTICULAR; INTRAMUSCULAR ONCE
Status: COMPLETED | OUTPATIENT
Start: 2025-04-08 | End: 2025-04-08

## 2025-04-08 RX ORDER — CETIRIZINE HYDROCHLORIDE 10 MG/1
10 TABLET ORAL DAILY
Qty: 30 TABLET | Refills: 0 | Status: SHIPPED | OUTPATIENT
Start: 2025-04-08

## 2025-04-08 RX ORDER — FLUTICASONE PROPIONATE 50 MCG
2 SPRAY, SUSPENSION (ML) NASAL DAILY
Qty: 16 G | Refills: 0 | Status: SHIPPED | OUTPATIENT
Start: 2025-04-08

## 2025-04-08 RX ADMIN — TRIAMCINOLONE ACETONIDE 40 MG: 40 INJECTION, SUSPENSION INTRA-ARTICULAR; INTRAMUSCULAR at 15:42

## 2025-04-08 ASSESSMENT — ENCOUNTER SYMPTOMS
SINUS PRESSURE: 1
COUGH: 0
DIARRHEA: 0
RHINORRHEA: 1
ABDOMINAL PAIN: 0
SINUS PAIN: 1
SHORTNESS OF BREATH: 0
EYE ITCHING: 1

## 2025-04-08 NOTE — PROGRESS NOTES
PROGRESS NOTE    SUBJECTIVE:   Deborah Schuster is a 59 y.o. female seen for a follow up visit regarding the following chief complaint:     Chief Complaint   Patient presents with    Other     Congestion, HA. Taking allergy medication, sore throat.            HPI patient presents office complaining of congestion watery nose runny eyes taking over-the-counter remedies but nothing is working      Past Medical History, Past Surgical History, Family history, Social History, and Medications were all reviewed with the patient today and updated as necessary.       Current Outpatient Medications   Medication Sig Dispense Refill    cetirizine (ZYRTEC) 10 MG tablet Take 1 tablet by mouth daily 30 tablet 0    fluticasone (FLONASE) 50 MCG/ACT nasal spray 2 sprays by Each Nostril route daily 16 g 0    montelukast (SINGULAIR) 10 MG tablet Take 1 tablet by mouth daily 30 tablet 11    rivastigmine (EXELON) 1.5 MG capsule Take 1 capsule by mouth 2 times daily 60 capsule 5    rivastigmine (EXELON) 1.5 MG capsule Take 1 capsule by mouth 2 times daily 10 capsule 3    atorvastatin (LIPITOR) 10 MG tablet Take 1 tablet by mouth daily 90 tablet 3    naproxen (NAPROSYN) 500 MG tablet Take 1 tablet by mouth 2 times daily (with meals) 60 tablet 3    memantine (NAMENDA) 10 MG tablet TAKE   1  TABLET  TWICE  DAILY 180 tablet 3    donepezil (ARICEPT) 10 MG tablet Take 1 tablet by mouth nightly 90 tablet 3    zaleplon (SONATA) 10 MG capsule       traZODone (DESYREL) 50 MG tablet Take 1 tablet by mouth 2 times daily 90 tablet 3    valsartan (DIOVAN) 160 MG tablet Take 1 tablet by mouth daily 90 tablet 3    busPIRone (BUSPAR) 30 MG tablet Take 30 mg by mouth 3 times daily      sertraline (ZOLOFT) 100 MG tablet TAKE 3 TABLETS BY MOUTH ONCE DAILY      clonazePAM (KLONOPIN) 1 MG tablet Take by mouth 2 times daily.      FLUoxetine (PROZAC) 40 MG capsule Take 1 capsule by mouth 2 times daily      loratadine-pseudoephedrine (CLARITIN-D 12HR) 5-120 MG per

## 2025-04-09 ENCOUNTER — CLINICAL DOCUMENTATION (OUTPATIENT)
Dept: NEUROLOGY | Age: 60
End: 2025-04-09

## 2025-04-09 NOTE — PROGRESS NOTES
I saw her just recently they let me know that the [employer] is not taking care of her hallucinations.  They never mentioned hallucinations during the visit.

## 2025-04-10 ENCOUNTER — APPOINTMENT (OUTPATIENT)
Dept: PHYSICAL THERAPY | Age: 60
End: 2025-04-10
Attending: FAMILY MEDICINE
Payer: MEDICARE

## 2025-04-10 ENCOUNTER — CLINICAL DOCUMENTATION (OUTPATIENT)
Dept: NEUROLOGY | Age: 60
End: 2025-04-10

## 2025-04-10 RX ORDER — OLANZAPINE 5 MG/1
5 TABLET ORAL NIGHTLY
Qty: 30 TABLET | Refills: 5 | Status: SHIPPED | OUTPATIENT
Start: 2025-04-10

## 2025-04-15 ENCOUNTER — HOSPITAL ENCOUNTER (OUTPATIENT)
Dept: PHYSICAL THERAPY | Age: 60
Setting detail: RECURRING SERIES
Discharge: HOME | End: 2025-04-18
Attending: FAMILY MEDICINE
Payer: MEDICARE

## 2025-04-15 PROCEDURE — 97140 MANUAL THERAPY 1/> REGIONS: CPT

## 2025-04-15 PROCEDURE — 97110 THERAPEUTIC EXERCISES: CPT

## 2025-04-15 NOTE — PROGRESS NOTES
Deborah Schuster  : 1965  Primary: [employer] Medicare-advantage Ppo (Medicare Managed)  Secondary:  [facility] [place]  [address]  [facility] [address]  Phone: [phone]  Fax: [phone] Plan Frequency: 2 times a week    Plan of Care/Certification Expiration Date: 25        Plan of Care/Certification Expiration Date:  Plan of Care/Certification Expiration Date: 25    Frequency/Duration: Plan Frequency: 2 times a week      Time In/Out:   Time In: 1230  Time Out: 1328      PT Visit Info:    Progress Note Counter: 5      Visit Count:  5    OUTPATIENT PHYSICAL THERAPY:   Treatment Note 4/15/2025       Episode  (L hip pain)               Treatment Diagnosis:    Pain in left hip  Stiffness of left hip, not elsewhere classified  Medical/Referring Diagnosis:    Chronic left hip pain [M25.552, G89.29]    Referring Physician:  Stefan Tai DO MD Orders:  PT Eval and Treat   Return MD Appt:  NA   Date of Onset:  Onset Date:  (Chronic: 3 months ago)     Allergies:   Patient has no known allergies.  Restrictions/Precautions:   None      Interventions Planned (Treatment may consist of any combination of the following):     See Assessment Note    Subjective Comments:   Patient reports that she has been dealing with a sinus infection as of recently resulting in her missing a few appointments.     Initial Pain Level:     2/10  Post Session Pain Level:      2/10  Medications Last Reviewed: 4/15/2025  Updated Objective Findings:  None Today  Treatment   THERAPEUTIC EXERCISE: (48 minutes):    Exercises per grid below to improve mobility, strength, and balance.  Required maximal visual, verbal, manual, and tactile cues to promote proper body alignment, promote proper body posture, and promote proper body mechanics.  Progressed resistance, range, and repetitions as indicated.   Date:   4/3/25 Date:   4/15/25 Date:  25   Activity/Exercise   Parameters   HEP

## 2025-04-16 ASSESSMENT — PAIN SCALES - GENERAL: PAINLEVEL_OUTOF10: 2

## 2025-04-17 ENCOUNTER — HOSPITAL ENCOUNTER (OUTPATIENT)
Dept: PHYSICAL THERAPY | Age: 60
Setting detail: RECURRING SERIES
Discharge: HOME | End: 2025-04-20
Attending: FAMILY MEDICINE
Payer: MEDICARE

## 2025-04-17 PROCEDURE — 97110 THERAPEUTIC EXERCISES: CPT

## 2025-04-17 ASSESSMENT — PAIN SCALES - GENERAL: PAINLEVEL_OUTOF10: 1

## 2025-04-17 NOTE — PROGRESS NOTES
Deborah Schuster  : 1965  Primary: [employer] Medicare-advantage Ppo (Medicare Managed)  Secondary:  [facility] [place]  [address]  [facility] [address]  Phone: [phone]  Fax: [phone] Plan Frequency: 2 times a week    Plan of Care/Certification Expiration Date: 25        Plan of Care/Certification Expiration Date:  Plan of Care/Certification Expiration Date: 25    Frequency/Duration: Plan Frequency: 2 times a week      Time In/Out:   Time In: 1000  Time Out: 1056      PT Visit Info:    Progress Note Counter: 6      Visit Count:  6    OUTPATIENT PHYSICAL THERAPY:   Treatment Note 2025       Episode  (L hip pain)               Treatment Diagnosis:    Pain in left hip  Stiffness of left hip, not elsewhere classified  Medical/Referring Diagnosis:    Chronic left hip pain [M25.552, G89.29]    Referring Physician:  Stefan Tai DO MD Orders:  PT Eval and Treat   Return MD Appt:  NA   Date of Onset:  Onset Date:  (Chronic: 3 months ago)     Allergies:   Patient has no known allergies.  Restrictions/Precautions:   None      Interventions Planned (Treatment may consist of any combination of the following):     See Assessment Note    Subjective Comments:   Patient and mom both state that she is doing much better feels like she is walking a lot better as well.    Initial Pain Level:     10  Post Session Pain Level:      10  Medications Last Reviewed: 2025  Updated Objective Findings:  None Today  Treatment   THERAPEUTIC EXERCISE: (56 minutes):    Exercises per grid below to improve mobility, strength, and balance.  Required maximal visual, verbal, manual, and tactile cues to promote proper body alignment, promote proper body posture, and promote proper body mechanics.  Progressed resistance, range, and repetitions as indicated.   Date:   4/3/25 Date:   4/15/25 Date:   25   Activity/Exercise      HEP education      [employer]

## 2025-04-22 ENCOUNTER — LAB (OUTPATIENT)
Dept: FAMILY MEDICINE CLINIC | Facility: CLINIC | Age: 60
End: 2025-04-22

## 2025-04-22 DIAGNOSIS — E78.49 FAMILIAL HYPERLIPIDEMIA: ICD-10-CM

## 2025-04-22 LAB
ALBUMIN SERPL-MCNC: 3.3 G/DL (ref 3.5–5)
ALBUMIN/GLOB SERPL: 1 (ref 1–1.9)
ALP SERPL-CCNC: 104 U/L (ref 35–104)
ALT SERPL-CCNC: 48 U/L (ref 8–45)
AST SERPL-CCNC: 49 U/L (ref 15–37)
BILIRUB DIRECT SERPL-MCNC: 0.1 MG/DL (ref 0–0.3)
BILIRUB SERPL-MCNC: 0.2 MG/DL (ref 0–1.2)
CHOLEST SERPL-MCNC: 171 MG/DL (ref 0–200)
GLOBULIN SER CALC-MCNC: 3.2 G/DL (ref 2.3–3.5)
HDLC SERPL-MCNC: 45 MG/DL (ref 40–60)
HDLC SERPL: 3.8 (ref 0–5)
LDLC SERPL CALC-MCNC: 102 MG/DL (ref 0–100)
PROT SERPL-MCNC: 6.5 G/DL (ref 6.3–8.2)
TRIGL SERPL-MCNC: 125 MG/DL (ref 0–150)
VLDLC SERPL CALC-MCNC: 25 MG/DL (ref 6–23)

## 2025-04-23 ENCOUNTER — HOSPITAL ENCOUNTER (OUTPATIENT)
Dept: PHYSICAL THERAPY | Age: 60
Setting detail: RECURRING SERIES
Discharge: HOME | End: 2025-04-26
Attending: FAMILY MEDICINE
Payer: MEDICARE

## 2025-04-23 PROCEDURE — 97110 THERAPEUTIC EXERCISES: CPT

## 2025-04-23 PROCEDURE — 97140 MANUAL THERAPY 1/> REGIONS: CPT

## 2025-04-23 ASSESSMENT — PAIN SCALES - GENERAL: PAINLEVEL_OUTOF10: 3

## 2025-04-23 NOTE — PROGRESS NOTES
Deborah Schuster  : 1965  Primary: [employer] Medicare-advantage Ppo (Medicare Managed)  Secondary:  [facility] [place]  [address]  [facility] [address]  Phone: [phone]  Fax: [phone] Plan Frequency: 2 times a week    Plan of Care/Certification Expiration Date: 25        Plan of Care/Certification Expiration Date:  Plan of Care/Certification Expiration Date: 25    Frequency/Duration: Plan Frequency: 2 times a week      Time In/Out:   Time In: 1340  Time Out: 1428      PT Visit Info:    Progress Note Counter: 7      Visit Count:  7    OUTPATIENT PHYSICAL THERAPY:   Treatment Note 2025       Episode  (L hip pain)               Treatment Diagnosis:    Pain in left hip  Stiffness of left hip, not elsewhere classified  Medical/Referring Diagnosis:    Chronic left hip pain [M25.552, G89.29]    Referring Physician:  Stefan Tai DO MD Orders:  PT Eval and Treat   Return MD Appt:  NA   Date of Onset:  Onset Date:  (Chronic: 3 months ago)     Allergies:   Patient has no known allergies.  Restrictions/Precautions:   None      Interventions Planned (Treatment may consist of any combination of the following):     See Assessment Note    Subjective Comments: Patient reports she is feeling \"good\" and feels that therapy is helping. Patient arrived 10 minutes late today.    Initial Pain Level:     3/10  Post Session Pain Level:      3/10  Medications Last Reviewed: 2025  Updated Objective Findings:   Cueing: Constant tactile cues were required during exercise to guide her for correct form.   Treatment   THERAPEUTIC EXERCISE: (37 minutes):    Exercises per grid below to improve mobility, strength, and balance.  Required maximal visual, verbal, manual, and tactile cues to promote proper body alignment, promote proper body posture, and promote proper body mechanics.  Progressed resistance, range, and repetitions as indicated.   Date:   25

## 2025-04-24 ENCOUNTER — CLINICAL DOCUMENTATION (OUTPATIENT)
Dept: NEUROLOGY | Age: 60
End: 2025-04-24

## 2025-04-24 RX ORDER — OLANZAPINE 5 MG/1
TABLET ORAL
Qty: 120 TABLET | Refills: 3 | Status: SHIPPED | OUTPATIENT
Start: 2025-04-24

## 2025-04-24 NOTE — PROGRESS NOTES
We are increasing Zyprexa 5 mg to 2 in the morning 2 at night and they are still trying to contact the psychiatrist   [FreeTextEntry1] : Back pain: denies pulmonary symptoms (cough, wheeze, shortness of breath). pain / discomfort appears musculoskeletal in nature.  F/u with orthopaedics,\par \par RTC prn.\par \par I, Marleni Lua NP, am scribing for and in the presence of Dr. Homar Frazier, the following sections HISTORY OF PRESENT ILLNESS, PAST MEDICAL/FAMILY/SOCIAL HISTORY; REVIEW OF SYSTEMS; VITAL SIGNS; PHYSICAL EXAM; DISPOSITION.\par \par

## 2025-04-30 ENCOUNTER — HOSPITAL ENCOUNTER (OUTPATIENT)
Dept: PHYSICAL THERAPY | Age: 60
Setting detail: RECURRING SERIES
Discharge: HOME | End: 2025-05-03
Attending: FAMILY MEDICINE
Payer: MEDICARE

## 2025-04-30 PROCEDURE — 97140 MANUAL THERAPY 1/> REGIONS: CPT

## 2025-04-30 PROCEDURE — 97110 THERAPEUTIC EXERCISES: CPT

## 2025-04-30 ASSESSMENT — PAIN SCALES - GENERAL: PAINLEVEL_OUTOF10: 3

## 2025-04-30 NOTE — PROGRESS NOTES
Deborah Schuster  : 1965  Primary: [employer] Medicare-advantage Ppo (Medicare Managed)  Secondary:  [facility] [place]  [address]  [facility] [address]  Phone: [phone]  Fax: [phone] Plan Frequency: 2 times a week    Plan of Care/Certification Expiration Date: 25        Plan of Care/Certification Expiration Date:  Plan of Care/Certification Expiration Date: 25    Frequency/Duration: Plan Frequency: 2 times a week      Time In/Out:   Time In: 1330  Time Out: 1425      PT Visit Info:    Progress Note Counter: 8      Visit Count:  8    OUTPATIENT PHYSICAL THERAPY:   Treatment Note 2025       Episode  (L hip pain)               Treatment Diagnosis:    Pain in left hip  Stiffness of left hip, not elsewhere classified  Medical/Referring Diagnosis:    Chronic left hip pain [M25.552, G89.29]    Referring Physician:  Stefan Tai DO MD Orders:  PT Eval and Treat   Return MD Appt:  NA   Date of Onset:  Onset Date:  (Chronic: 3 months ago)     Allergies:   Patient has no known allergies.  Restrictions/Precautions:   None      Interventions Planned (Treatment may consist of any combination of the following):     See Assessment Note    Subjective Comments: Patient reports feeling \"a little bit\" of pain today prior to therapy.     Initial Pain Level:     3/10  Post Session Pain Level:      3/10  Medications Last Reviewed: 2025  Updated Objective Findings:   Cueing: Constant verbal and tactile cues were required during exercises to guide her for proper form.   Treatment   THERAPEUTIC EXERCISE: (45 minutes):    Exercises per grid below to improve mobility, strength, and balance.  Required maximal visual, verbal, manual, and tactile cues to promote proper body alignment, promote proper body posture, and promote proper body mechanics.  Progressed resistance, range, and repetitions as indicated.   Date:   25 Date:   25 Date:   25

## 2025-05-02 ENCOUNTER — TELEMEDICINE (OUTPATIENT)
Dept: FAMILY MEDICINE CLINIC | Facility: CLINIC | Age: 60
End: 2025-05-02

## 2025-05-02 DIAGNOSIS — Z00.00 LABORATORY EXAM ORDERED AS PART OF ROUTINE GENERAL MEDICAL EXAMINATION: ICD-10-CM

## 2025-05-02 DIAGNOSIS — E78.00 HYPERCHOLESTEROLEMIA: ICD-10-CM

## 2025-05-02 DIAGNOSIS — I10 PRIMARY HYPERTENSION: ICD-10-CM

## 2025-05-02 DIAGNOSIS — E55.9 VITAMIN D DEFICIENCY, UNSPECIFIED: ICD-10-CM

## 2025-05-02 DIAGNOSIS — E78.49 FAMILIAL HYPERLIPIDEMIA: Primary | ICD-10-CM

## 2025-05-02 DIAGNOSIS — E55.9 VITAMIN D DEFICIENCY: ICD-10-CM

## 2025-05-02 NOTE — PROGRESS NOTES
PROGRESS NOTE    SUBJECTIVE:   Deborah Schuster is a 59 y.o. female seen for a follow up visit regarding the following chief complaint:     Chief Complaint   Patient presents with    Follow-up     labs           HPI patient is on a phone call visit over lab results without any new complaintsDeborah Schuster is a 59 y.o. female evaluated via telephone on 5/2/2025 for Follow-up (labs)  .      15 minutes  Total Time: minutes: 11-20 minutes    Deborah Schuster was evaluated through a synchronous (real-time) audio encounter. Patient identification was verified at the start of the visit. She (or guardian if applicable) is aware that this is a billable service, which includes applicable co-pays. This visit was conducted with the patient's (and/or legal guardian's) verbal consent. She has not had a related appointment within my department in the past 7 days or scheduled within the next 24 hours.   The patient was located at Home: [address] [place] [id number].  The provider was located at Facility (Appt Dept): [address] [place] [address] [place] [address].    Note: not billable if this call serves to triage the patient into an appointment for the relevant concern         Past Medical History, Past Surgical History, Family history, Social History, and Medications were all reviewed with the patient today and updated as necessary.       Current Outpatient Medications   Medication Sig Dispense Refill    OLANZapine (ZYPREXA) 5 MG tablet Take 1 tablet by mouth nightly 30 tablet 5    cetirizine (ZYRTEC) 10 MG tablet Take 1 tablet by mouth daily 30 tablet 0    fluticasone (FLONASE) 50 MCG/ACT nasal spray 2 sprays by Each Nostril route daily 16 g 0    montelukast (SINGULAIR) 10 MG tablet Take 1 tablet by mouth daily 30 tablet 11    rivastigmine (EXELON) 1.5 MG capsule Take 1 capsule by mouth 2 times daily 10 capsule 3    atorvastatin (LIPITOR) 10 MG tablet Take 1 tablet by mouth daily 90 tablet 3    naproxen (NAPROSYN) 500

## 2025-05-06 ENCOUNTER — HOSPITAL ENCOUNTER (OUTPATIENT)
Dept: PHYSICAL THERAPY | Age: 60
Setting detail: RECURRING SERIES
Discharge: HOME | End: 2025-05-09
Attending: FAMILY MEDICINE
Payer: MEDICARE

## 2025-05-06 PROCEDURE — 97110 THERAPEUTIC EXERCISES: CPT

## 2025-05-06 PROCEDURE — 97140 MANUAL THERAPY 1/> REGIONS: CPT

## 2025-05-06 ASSESSMENT — PAIN SCALES - GENERAL: PAINLEVEL_OUTOF10: 1

## 2025-05-06 NOTE — PROGRESS NOTES
Deborah Schuster  : 1965  Primary: [employer] Medicare-advantage Ppo (Medicare Managed)  Secondary:  [facility] [place]  [address]  [facility] [address]  Phone: [phone]  Fax: [phone] Plan Frequency: 2 times a week    Plan of Care/Certification Expiration Date: 25        Plan of Care/Certification Expiration Date:  Plan of Care/Certification Expiration Date: 25    Frequency/Duration: Plan Frequency: 2 times a week      Time In/Out:   Time In: 1330  Time Out: 1426      PT Visit Info:    Progress Note Counter: 9      Visit Count:  9    OUTPATIENT PHYSICAL THERAPY:   Treatment Note 2025       Episode  (L hip pain)               Treatment Diagnosis:    Pain in left hip  Stiffness of left hip, not elsewhere classified  Medical/Referring Diagnosis:    Chronic left hip pain [M25.552, G89.29]    Referring Physician:  Stefan Tai DO MD Orders:  PT Eval and Treat   Return MD Appt:  NA   Date of Onset:  Onset Date:  (Chronic: 3 months ago)     Allergies:   Patient has no known allergies.  Restrictions/Precautions:   None      Interventions Planned (Treatment may consist of any combination of the following):     See Assessment Note    Subjective Comments: Patient reports that she is doing good, has been feeling more confident in regards to walking.  Feels comfortable walking without a walker as of recently.      Initial Pain Level:     1/10  Post Session Pain Level:      1/10  Medications Last Reviewed: 2025  Updated Objective Findings:   Cueing: Constant verbal and tactile cues were required during exercises to guide her for proper form.   Treatment   THERAPEUTIC EXERCISE: (45 minutes):    Exercises per grid below to improve mobility, strength, and balance.  Required maximal visual, verbal, manual, and tactile cues to promote proper body alignment, promote proper body posture, and promote proper body mechanics.  Progressed resistance,

## 2025-05-08 ENCOUNTER — HOSPITAL ENCOUNTER (OUTPATIENT)
Dept: PHYSICAL THERAPY | Age: 60
Setting detail: RECURRING SERIES
Discharge: HOME | End: 2025-05-11
Attending: FAMILY MEDICINE
Payer: MEDICARE

## 2025-05-08 PROCEDURE — 97140 MANUAL THERAPY 1/> REGIONS: CPT

## 2025-05-08 PROCEDURE — 97110 THERAPEUTIC EXERCISES: CPT

## 2025-05-08 ASSESSMENT — PAIN SCALES - GENERAL: PAINLEVEL_OUTOF10: 1

## 2025-05-08 NOTE — PROGRESS NOTES
Deborah Schuster  : 1965  Primary: [employer] Medicare-advantage Ppo (Medicare Managed)  Secondary:  [facility] [place]  [address]  [facility] [address]  Phone: [phone]  Fax: [phone] Plan Frequency: 2 times a week    Plan of Care/Certification Expiration Date: 25        Plan of Care/Certification Expiration Date:  Plan of Care/Certification Expiration Date: 25    Frequency/Duration: Plan Frequency: 2 times a week      Time In/Out:   Time In: 1330  Time Out: 1430      PT Visit Info:    Progress Note Counter: 10      Visit Count:  10    OUTPATIENT PHYSICAL THERAPY:   Treatment Note 2025       Episode  (L hip pain)               Treatment Diagnosis:    Pain in left hip  Stiffness of left hip, not elsewhere classified  Medical/Referring Diagnosis:    Chronic left hip pain [M25.552, G89.29]    Referring Physician:  Stefan Tai DO MD Orders:  PT Eval and Treat   Return MD Appt:  NA   Date of Onset:  Onset Date:  (Chronic: 3 months ago)     Allergies:   Patient has no known allergies.  Restrictions/Precautions:   None      Interventions Planned (Treatment may consist of any combination of the following):     See Assessment Note    Subjective Comments: Patient reports that she is feeling better.      Initial Pain Level:     1/10  Post Session Pain Level:      1/10  Medications Last Reviewed: 2025  Updated Objective Findings:   Cueing: Constant verbal and tactile cues were required during exercises to guide her for proper form.   Treatment   THERAPEUTIC EXERCISE: (50 minutes):    Exercises per grid below to improve mobility, strength, and balance.  Required maximal visual, verbal, manual, and tactile cues to promote proper body alignment, promote proper body posture, and promote proper body mechanics.  Progressed resistance, range, and repetitions as indicated.   Date:   25 Date:   25 Date:   25   Activity/Exercise      HEP

## 2025-05-13 ENCOUNTER — HOSPITAL ENCOUNTER (OUTPATIENT)
Dept: PHYSICAL THERAPY | Age: 60
Setting detail: RECURRING SERIES
Discharge: HOME | End: 2025-05-16
Attending: FAMILY MEDICINE
Payer: MEDICARE

## 2025-05-13 ENCOUNTER — TELEPHONE (OUTPATIENT)
Dept: NEUROLOGY | Age: 60
End: 2025-05-13

## 2025-05-13 PROCEDURE — 97140 MANUAL THERAPY 1/> REGIONS: CPT

## 2025-05-13 PROCEDURE — 97110 THERAPEUTIC EXERCISES: CPT

## 2025-05-13 RX ORDER — OLANZAPINE 5 MG/1
TABLET, FILM COATED ORAL
Qty: 120 TABLET | Refills: 5 | Status: SHIPPED | OUTPATIENT
Start: 2025-05-13

## 2025-05-13 ASSESSMENT — PAIN SCALES - GENERAL: PAINLEVEL_OUTOF10: 0

## 2025-05-13 NOTE — PROGRESS NOTES
Deborah Schuster  : 1965  Primary: [employer] Medicare-advantage Ppo (Medicare Managed)  Secondary:  [facility] [place]  [address]  [facility] [address]  Phone: [phone]  Fax: [phone] Plan Frequency: 2 times a week    Plan of Care/Certification Expiration Date: 25        Plan of Care/Certification Expiration Date:  Plan of Care/Certification Expiration Date: 25    Frequency/Duration: Plan Frequency: 2 times a week      Time In/Out:   Time In: 1430  Time Out: 1527      PT Visit Info:    Progress Note Counter: 11      Visit Count:  11    OUTPATIENT PHYSICAL THERAPY:   Treatment Note 2025       Episode  (L hip pain)               Treatment Diagnosis:    Pain in left hip  Stiffness of left hip, not elsewhere classified  Medical/Referring Diagnosis:    Chronic left hip pain [M25.552, G89.29]    Referring Physician:  Stefan Tai DO MD Orders:  PT Eval and Treat   Return MD Appt:  NA   Date of Onset:  Onset Date:  (Chronic: 3 months ago)     Allergies:   Patient has no known allergies.  Restrictions/Precautions:   None      Interventions Planned (Treatment may consist of any combination of the following):     See Assessment Note    Subjective Comments: Patient reports that she continues to improve and hasn't had to utilize a walker in a while.      Initial Pain Level:     0/10  Post Session Pain Level:      0/10  Medications Last Reviewed: 2025  Updated Objective Findings:   Cueing: Constant verbal and tactile cues were required during exercises to guide her for proper form.   Treatment   THERAPEUTIC EXERCISE: (50 minutes):    Exercises per grid below to improve mobility, strength, and balance.  Required maximal visual, verbal, manual, and tactile cues to promote proper body alignment, promote proper body posture, and promote proper body mechanics.  Progressed resistance, range, and repetitions as indicated.   Date:   25 Date:

## 2025-05-15 ENCOUNTER — HOSPITAL ENCOUNTER (OUTPATIENT)
Dept: PHYSICAL THERAPY | Age: 60
Setting detail: RECURRING SERIES
Discharge: HOME | End: 2025-05-18
Attending: FAMILY MEDICINE
Payer: MEDICARE

## 2025-05-15 PROCEDURE — 97140 MANUAL THERAPY 1/> REGIONS: CPT

## 2025-05-15 PROCEDURE — 97110 THERAPEUTIC EXERCISES: CPT

## 2025-05-15 ASSESSMENT — PAIN SCALES - GENERAL: PAINLEVEL_OUTOF10: 0

## 2025-05-15 NOTE — PROGRESS NOTES
Deborah Schuster  : 1965  Primary: [employer] Medicare-advantage Ppo (Medicare Managed)  Secondary:  [facility] [place]  [address]  [facility] [address]  Phone: [phone]  Fax: [phone] Plan Frequency: 2 times a week  Plan of Care/Certification Expiration Date: 25        Plan of Care/Certification Expiration Date:  Plan of Care/Certification Expiration Date: 25    Frequency/Duration: Plan Frequency: 2 times a week      Time In/Out:   Time In: 1430  Time Out: 1527      PT Visit Info:    Progress Note Counter: 11      Visit Count:  11                OUTPATIENT PHYSICAL THERAPY:             Initial Assessment 2025               Episode (L hip pain)         Treatment Diagnosis:     Pain in left hip  Stiffness of left hip, not elsewhere classified  Medical/Referring Diagnosis:    Chronic left hip pain [M25.552, G89.29]    Referring Physician:  Stefan Tai DO MD Orders:  PT Eval and Treat   Return MD Appt:  NA  Date of Onset:  Onset Date:  (Chronic: 3 months ago)     Allergies:  Patient has no known allergies.  Restrictions/Precautions:    None      Medications Last Reviewed: 2025     SUBJECTIVE   History of Injury/Illness (Reason for Referral):  Patient presents to therapy with her mother.  Patient is a poor historian so information gathered is from both mother and patient.  They report that the patient has been dealing with L hip pain for several months now and can't recall any incident nor episode that initiated the pain.  Patient pinpoints most of her pain to be occurring throughout her L groin area and states that the pain is excruciating enough to limit her tolerance of standing and walking to no more than 5 minutes.  Rates the pain at rest a 5/10 and with increased walking and standing it increases to 8-9/10.  Can't figure out any kind of intervention that alleviates her symptoms.  The pain is so severe that she requires a

## 2025-05-15 NOTE — PROGRESS NOTES
Deborah Schuster  : 1965  Primary: [employer] Medicare-advantage Ppo (Medicare Managed)  Secondary:  [facility] [place]  [address]  [facility] [address]  Phone: [phone]  Fax: [phone] Plan Frequency: 2 times a week    Plan of Care/Certification Expiration Date: 25        Plan of Care/Certification Expiration Date:  Plan of Care/Certification Expiration Date: 25    Frequency/Duration: Plan Frequency: 2 times a week      Time In/Out:   Time In: 1430  Time Out: 1526      PT Visit Info:    Progress Note Counter: 11      Visit Count:  12    OUTPATIENT PHYSICAL THERAPY:   Treatment Note 5/15/2025       Episode  (L hip pain)               Treatment Diagnosis:    Pain in left hip  Stiffness of left hip, not elsewhere classified  Medical/Referring Diagnosis:    Chronic left hip pain [M25.552, G89.29]    Referring Physician:  Stefan Tai DO MD Orders:  PT Eval and Treat   Return MD Appt:  NA   Date of Onset:  Onset Date:  (Chronic: 3 months ago)     Allergies:   Patient has no known allergies.  Restrictions/Precautions:   None      Interventions Planned (Treatment may consist of any combination of the following):     See Assessment Note    Subjective Comments: Patient reports that she is doing good, didn't have any soreness after her previous session.      Initial Pain Level:     0/10  Post Session Pain Level:      0/10  Medications Last Reviewed: 5/15/2025  Updated Objective Findings:   Cueing: Constant verbal and tactile cues were required during exercises to guide her for proper form.   Treatment   THERAPEUTIC EXERCISE: (46 minutes):    Exercises per grid below to improve mobility, strength, and balance.  Required maximal visual, verbal, manual, and tactile cues to promote proper body alignment, promote proper body posture, and promote proper body mechanics.  Progressed resistance, range, and repetitions as indicated.   Date:   25 Date:

## 2025-05-19 ENCOUNTER — HOSPITAL ENCOUNTER (OUTPATIENT)
Dept: PHYSICAL THERAPY | Age: 60
Setting detail: RECURRING SERIES
Discharge: HOME | End: 2025-05-22
Attending: FAMILY MEDICINE
Payer: MEDICARE

## 2025-05-19 PROCEDURE — 97110 THERAPEUTIC EXERCISES: CPT

## 2025-05-19 PROCEDURE — 97140 MANUAL THERAPY 1/> REGIONS: CPT

## 2025-05-19 ASSESSMENT — PAIN SCALES - GENERAL
PAINLEVEL_OUTOF10: 0

## 2025-05-19 NOTE — PROGRESS NOTES
Bridge  - 1 x daily - 7 x weekly - 3 sets - 10 reps  - Modified Alejandro Stretch  - 1 x daily - 7 x weekly - 3 sets - 10 reps  - Standing Hip Extension with Counter Support  - 1 x daily - 7 x weekly - 3 sets - 10 reps  - Standing Knee Flexion with Counter Support  - 1 x daily - 7 x weekly - 3 sets - 10 reps    Treatment/Session Summary:    Treatment Assessment: Patient did well throughout her session today, was able to tolerate most of the session without any assistive device.    Communication/Consultation:  None today  Equipment provided today:  None  Recommendations/Intent for next treatment session: Next visit will focus on improving hip mobility.    >Total Treatment Billable Duration: 57 minutes   Time In: 1230  Time Out: 1327     Eddie Roy PT         Charge Capture  Events  [employer] Portal  Appt Desk  Attendance Report     Future Appointments   Date Time Provider Department Center   5/21/2025 12:30 PM Eddie Roy PT [facility]   6/3/2025  2:30 PM Eddie Roy PT [facility]   6/5/2025 12:30 PM Eddie Roy PT [facility]   6/9/2025  2:30 PM Eddie Roy, PT [facility]   6/11/2025  2:30 PM Eddie Roy, PT [facility]   9/9/2025  8:20 AM [facility]   9/30/2025  3:30 PM Fredis Lord DO [facility]

## 2025-05-20 ASSESSMENT — PAIN SCALES - GENERAL: PAINLEVEL_OUTOF10: 0

## 2025-05-21 ENCOUNTER — HOSPITAL ENCOUNTER (OUTPATIENT)
Dept: PHYSICAL THERAPY | Age: 60
Setting detail: RECURRING SERIES
Discharge: HOME | End: 2025-05-24
Attending: FAMILY MEDICINE
Payer: MEDICARE

## 2025-05-21 PROCEDURE — 97140 MANUAL THERAPY 1/> REGIONS: CPT

## 2025-05-21 PROCEDURE — 97110 THERAPEUTIC EXERCISES: CPT

## 2025-05-21 NOTE — PROGRESS NOTES
Deborah Schuster  : 1965  Primary: [employer] Medicare-advantage Ppo (Medicare Managed)  Secondary:  [facility] [place]  [address]  [facility] [address]  Phone: [phone]  Fax: [phone] Plan Frequency: 2 times a week    Plan of Care/Certification Expiration Date: 25        Plan of Care/Certification Expiration Date:  Plan of Care/Certification Expiration Date: 25    Frequency/Duration: Plan Frequency: 2 times a week      Time In/Out:   Time In: 1230  Time Out: 1328      PT Visit Info:    Progress Note Counter: 3      Visit Count:  14    OUTPATIENT PHYSICAL THERAPY:   Treatment Note 2025       Episode  (L hip pain)                 Treatment Diagnosis:    Pain in left hip  Stiffness of left hip, not elsewhere classified  Medical/Referring Diagnosis:    Chronic left hip pain [M25.552, G89.29]    Referring Physician:  Stefan Tai DO MD Orders:  PT Eval and Treat   Return MD Appt:  NA   Date of Onset:  Onset Date:  (Chronic: 3 months ago)     Allergies:   Patient has no known allergies.  Restrictions/Precautions:   None      Interventions Planned (Treatment may consist of any combination of the following):     See Assessment Note    Subjective Comments: Patient reports that she is doing good, felt fine after our previous session.     Initial Pain Level:     0/10  Post Session Pain Level:      0/10  Medications Last Reviewed: 2025  Updated Objective Findings:   Cueing: Constant verbal and tactile cues were required during exercises to guide her for proper form.   Treatment   THERAPEUTIC EXERCISE: (46 minutes):    Exercises per grid below to improve mobility, strength, and balance.  Required maximal visual, verbal, manual, and tactile cues to promote proper body alignment, promote proper body posture, and promote proper body mechanics.  Progressed resistance, range, and repetitions as indicated.   Date:   25 Date:   25 Date:

## 2025-05-22 ASSESSMENT — PAIN SCALES - GENERAL: PAINLEVEL_OUTOF10: 0

## 2025-05-28 ENCOUNTER — HOSPITAL ENCOUNTER (OUTPATIENT)
Dept: PHYSICAL THERAPY | Age: 60
Setting detail: RECURRING SERIES
Discharge: HOME | End: 2025-05-31
Attending: FAMILY MEDICINE
Payer: MEDICARE

## 2025-05-28 PROCEDURE — 97110 THERAPEUTIC EXERCISES: CPT

## 2025-05-28 ASSESSMENT — PAIN SCALES - GENERAL: PAINLEVEL_OUTOF10: 0

## 2025-05-28 NOTE — PROGRESS NOTES
and repetitions as indicated.   Date:   5/19/25 Date:   5/21/25 Date:   5/28/25   Activity/Exercise      HEP education      Butterfly stretch  Supine x 30 sec      Modified rebeca stretch  Side-lying, therapist assisted 5 x 15'' holds  Side-lying, therapist assisted 5 x 15'' holds  Side-lying, therapist assisted 5 x 15'' holds    Supine bridges  2 x 10 w/ purple band for hip abd  2 x 10 w/ hip adduction squeezes  2 x 10 w/ hip adduction squeezes    Hip flexor stretch   Forward lean on 6 in step x 10 ea LE  Forward lean on 6 in step x 10 ea LE    Hip extension   Standing 2 x 10  Standing 2 x 10    Hamstring curls       Hamstring stretch       Hip adductor stretch   W/ strap x 5  W/ strap x 5    Hip external rotation, log roll       Physioball hamstring curls in supine       NuStep   L2 x 6 min for ROM and endurance  L2 x 6 min for ROM and endurance    Gait mechanis  Ambulated 3 x 160 ft without walker emphasis on hip extension  Ambulated 3 x 160 ft without walker emphasis on hip extension   Sit to stands  From elevated table x 10, x 10 w/ 15# KB  From elevated table 2 x 10 w/ 15# KB   SAQ / LAQ      Hamstring curls   X 10 standing  X 10      MANUAL THERAPY: (10minutes):   Joint mobilization, Soft tissue mobilization, and Manipulation was utilized and necessary because of the patient's restricted joint motion, painful spasm, loss of articular motion, and restricted motion of soft tissue.   Manual Therapy Technique and Location Date:   5/15/25 Date:   5/19/25 Date:   5/21/25          Long axis traction of L hip in supine position grade III Long axis traction of L hip in supine position grade III Long axis traction of L hip in supine position grade III      MODALITIES:      Access Code: [email or web address]  URL: [email or web address]  Date: 03/21/2025  Prepared by: Eddie Roy    Exercises  - Supine Butterfly Groin Stretch  - 1 x daily - 7 x weekly - 3 sets - 10 reps  - Supine Bridge  - 1 x daily - 7 x weekly - 3

## 2025-06-02 ENCOUNTER — TELEPHONE (OUTPATIENT)
Dept: FAMILY MEDICINE CLINIC | Facility: CLINIC | Age: 60
End: 2025-06-02

## 2025-06-02 ENCOUNTER — OFFICE VISIT (OUTPATIENT)
Dept: FAMILY MEDICINE CLINIC | Facility: CLINIC | Age: 60
End: 2025-06-02
Payer: MEDICARE

## 2025-06-02 VITALS
HEART RATE: 67 BPM | WEIGHT: 288 LBS | SYSTOLIC BLOOD PRESSURE: 142 MMHG | DIASTOLIC BLOOD PRESSURE: 96 MMHG | BODY MASS INDEX: 42.65 KG/M2 | HEIGHT: 69 IN

## 2025-06-02 DIAGNOSIS — M25.562 ACUTE PAIN OF LEFT KNEE: Primary | ICD-10-CM

## 2025-06-02 DIAGNOSIS — M25.552 LEFT HIP PAIN: ICD-10-CM

## 2025-06-02 DIAGNOSIS — I10 PRIMARY HYPERTENSION: ICD-10-CM

## 2025-06-02 PROCEDURE — 99214 OFFICE O/P EST MOD 30 MIN: CPT | Performed by: FAMILY MEDICINE

## 2025-06-02 PROCEDURE — 3080F DIAST BP >= 90 MM HG: CPT | Performed by: FAMILY MEDICINE

## 2025-06-02 PROCEDURE — 3077F SYST BP >= 140 MM HG: CPT | Performed by: FAMILY MEDICINE

## 2025-06-02 PROCEDURE — 96372 THER/PROPH/DIAG INJ SC/IM: CPT | Performed by: FAMILY MEDICINE

## 2025-06-02 RX ORDER — NAPROXEN 500 MG/1
500 TABLET ORAL 2 TIMES DAILY WITH MEALS
Qty: 60 TABLET | Refills: 0 | Status: SHIPPED | OUTPATIENT
Start: 2025-06-02

## 2025-06-02 RX ORDER — KETOROLAC TROMETHAMINE 30 MG/ML
60 INJECTION, SOLUTION INTRAMUSCULAR; INTRAVENOUS ONCE
Status: COMPLETED | OUTPATIENT
Start: 2025-06-02 | End: 2025-06-02

## 2025-06-02 RX ADMIN — KETOROLAC TROMETHAMINE 60 MG: 30 INJECTION, SOLUTION INTRAMUSCULAR; INTRAVENOUS at 16:08

## 2025-06-02 ASSESSMENT — ENCOUNTER SYMPTOMS
SHORTNESS OF BREATH: 0
SINUS PAIN: 0
RHINORRHEA: 0
COUGH: 0
ABDOMINAL PAIN: 0
DIARRHEA: 0

## 2025-06-02 NOTE — PROGRESS NOTES
PROGRESS NOTE    SUBJECTIVE:   Deborah Schuster is a 59 y.o. female seen for a follow up visit regarding the following chief complaint:     Chief Complaint   Patient presents with    Leg Pain     Left leg pain started Saturday. Can't walk due to pain. No injury that they know of.   Did PT for Left hip/groin pain. Was almost better.            HPI  Patient presents office complaining of left knee pain denies any trauma    Past Medical History, Past Surgical History, Family history, Social History, and Medications were all reviewed with the patient today and updated as necessary.       Current Outpatient Medications   Medication Sig Dispense Refill    naproxen (NAPROSYN) 500 MG tablet Take 1 tablet by mouth 2 times daily (with meals) 60 tablet 0    OLANZapine (ZYPREXA) 5 MG tablet Take 2 bid 120 tablet 5    OLANZapine (ZYPREXA) 5 MG tablet Take 1 tablet by mouth nightly 30 tablet 5    cetirizine (ZYRTEC) 10 MG tablet Take 1 tablet by mouth daily 30 tablet 0    fluticasone (FLONASE) 50 MCG/ACT nasal spray 2 sprays by Each Nostril route daily 16 g 0    rivastigmine (EXELON) 1.5 MG capsule Take 1 capsule by mouth 2 times daily 10 capsule 3    atorvastatin (LIPITOR) 10 MG tablet Take 1 tablet by mouth daily 90 tablet 3    naproxen (NAPROSYN) 500 MG tablet Take 1 tablet by mouth 2 times daily (with meals) 60 tablet 3    memantine (NAMENDA) 10 MG tablet TAKE   1  TABLET  TWICE  DAILY 180 tablet 3    donepezil (ARICEPT) 10 MG tablet Take 1 tablet by mouth nightly 90 tablet 3    zaleplon (SONATA) 10 MG capsule       traZODone (DESYREL) 50 MG tablet Take 1 tablet by mouth 2 times daily 90 tablet 3    valsartan (DIOVAN) 160 MG tablet Take 1 tablet by mouth daily 90 tablet 3    busPIRone (BUSPAR) 30 MG tablet Take 30 mg by mouth 3 times daily      sertraline (ZOLOFT) 100 MG tablet TAKE 3 TABLETS BY MOUTH ONCE DAILY      clonazePAM (KLONOPIN) 1 MG tablet Take by mouth 2 times daily.      FLUoxetine (PROZAC) 40 MG capsule Take 1

## 2025-06-02 NOTE — TELEPHONE ENCOUNTER
Dr. Zaire Lombardo wanted to give you a heads up that patient is returning back to your clinic due to knee pain same side as hip pain that she was seeing you for. Pain started this past weekend.

## 2025-06-03 ENCOUNTER — HOSPITAL ENCOUNTER (OUTPATIENT)
Dept: PHYSICAL THERAPY | Age: 60
Setting detail: RECURRING SERIES
Discharge: HOME | End: 2025-06-06
Attending: FAMILY MEDICINE
Payer: MEDICARE

## 2025-06-03 PROCEDURE — 97140 MANUAL THERAPY 1/> REGIONS: CPT

## 2025-06-03 PROCEDURE — 97110 THERAPEUTIC EXERCISES: CPT

## 2025-06-05 ENCOUNTER — HOSPITAL ENCOUNTER (OUTPATIENT)
Dept: PHYSICAL THERAPY | Age: 60
Setting detail: RECURRING SERIES
Discharge: HOME | End: 2025-06-08
Attending: FAMILY MEDICINE
Payer: MEDICARE

## 2025-06-05 PROCEDURE — 97110 THERAPEUTIC EXERCISES: CPT

## 2025-06-05 PROCEDURE — 97140 MANUAL THERAPY 1/> REGIONS: CPT

## 2025-06-06 ASSESSMENT — PAIN SCALES - GENERAL
PAINLEVEL_OUTOF10: 7
PAINLEVEL_OUTOF10: 0

## 2025-06-06 NOTE — PROGRESS NOTES
Deborah Schuster  : 1965  Primary: [employer] Medicare-advantage Ppo (Medicare Managed)  Secondary:  [facility] [place]  [address]ika [address]  [facility] [address]  Phone: [phone]  Fax: [phone] Plan Frequency: 2 times a week    Plan of Care/Certification Expiration Date: 25        Plan of Care/Certification Expiration Date:  Plan of Care/Certification Expiration Date: 25    Frequency/Duration: Plan Frequency: 2 times a week      Time In/Out:   Time In: 1430  Time Out: 1530      PT Visit Info:    Progress Note Counter: 5      Visit Count:  16    OUTPATIENT PHYSICAL THERAPY:   Treatment Note 6/3/2025       Episode  (L hip pain)                 Treatment Diagnosis:    Pain in left hip  Stiffness of left hip, not elsewhere classified  Medical/Referring Diagnosis:    Chronic left hip pain [M25.552, G89.29]    Referring Physician:  Stefan Tai DO MD Orders:  PT Eval and Treat   Return MD Appt:  NA   Date of Onset:  Onset Date:  (Chronic: 3 months ago)     Allergies:   Patient has no known allergies.  Restrictions/Precautions:   None      Interventions Planned (Treatment may consist of any combination of the following):     See Assessment Note    Subjective Comments: Patient reports that she has been having significant knee pain for the past few days now as well as significant swelling.  Patient went to her PCP who took x-rays and came back unremarkable.   Initial Pain Level:     7/10  Post Session Pain Level:      7/10  Medications Last Reviewed: 6/3/2025  Updated Objective Findings:   significant tightness throughout the posterior aspect of her L knee   Treatment   THERAPEUTIC EXERCISE: (40 minutes):    Exercises per grid below to improve mobility, strength, and balance.  Required maximal visual, verbal, manual, and tactile cues to promote proper body alignment, promote proper body posture, and promote proper body mechanics.  Progressed resistance,

## 2025-06-09 ENCOUNTER — HOSPITAL ENCOUNTER (OUTPATIENT)
Dept: PHYSICAL THERAPY | Age: 60
Setting detail: RECURRING SERIES
Discharge: HOME | End: 2025-06-12
Attending: FAMILY MEDICINE
Payer: MEDICARE

## 2025-06-09 PROCEDURE — 97110 THERAPEUTIC EXERCISES: CPT

## 2025-06-09 PROCEDURE — 97140 MANUAL THERAPY 1/> REGIONS: CPT

## 2025-06-09 ASSESSMENT — PAIN SCALES - GENERAL: PAINLEVEL_OUTOF10: 7

## 2025-06-09 NOTE — PROGRESS NOTES
Deborah Schuster  : 1965  Primary: [employer] (Medicare Managed)  Secondary:  [facility] [place]  [address]  [facility] [address]  Phone: [phone]  Fax: [phone] Plan Frequency: 2 times a week    Plan of Care/Certification Expiration Date: 25        Plan of Care/Certification Expiration Date:  Plan of Care/Certification Expiration Date: 25    Frequency/Duration: Plan Frequency: 2 times a week      Time In/Out:   Time In: 1230  Time Out: 1330      PT Visit Info:    Progress Note Counter: 1      Visit Count:  17    OUTPATIENT PHYSICAL THERAPY:   Treatment Note 2025       Episode  (L hip pain)                 Treatment Diagnosis:    Pain in left hip  Stiffness of left hip, not elsewhere classified  Medical/Referring Diagnosis:    Chronic left hip pain [M25.552, G89.29]  Pain in left knee [M25.562]    Referring Physician:  Stefan Tai DO MD Orders:  PT Eval and Treat   Return MD Appt:  NA   Date of Onset:  Onset Date:  (Chronic: 3 months ago)     Allergies:   Patient has no known allergies.  Restrictions/Precautions:   None      Interventions Planned (Treatment may consist of any combination of the following):     See Assessment Note    Subjective Comments: Patient states that she feels like her knee pain has improved a minimal amount since the last time she was in therapy.    Initial Pain Level:     7/10  Post Session Pain Level:      7/10  Medications Last Reviewed: 2025  Updated Objective Findings:   significant tightness throughout the posterior aspect of her L knee   Treatment   THERAPEUTIC EXERCISE: (40 minutes):    Exercises per grid below to improve mobility, strength, and balance.  Required maximal visual, verbal, manual, and tactile cues to promote proper body alignment, promote proper body posture, and promote proper body mechanics.  Progressed resistance, range, and repetitions as indicated.   Date: 
strength, range of motion, and balance to increase independence with ADLs.  Reason For Services/Other Comments:  > Patient continues to demonstrate capacity to improve ROM, strength, and activity tolerance which will increase independence.      Regarding Deborah Schuster's therapy, I certify that the treatment plan above will be carried out by a therapist or under their direction.  Thank you for this referral,  Eddie Roy PT     Referring Physician Signature: Stefan Tai,  _______________________________ Date _____________        Charge Capture  Events  Appt Desk  Attendance Report

## 2025-06-11 ENCOUNTER — HOSPITAL ENCOUNTER (OUTPATIENT)
Dept: PHYSICAL THERAPY | Age: 60
Setting detail: RECURRING SERIES
Discharge: HOME | End: 2025-06-14
Attending: FAMILY MEDICINE
Payer: MEDICARE

## 2025-06-11 ENCOUNTER — TELEPHONE (OUTPATIENT)
Dept: FAMILY MEDICINE CLINIC | Facility: CLINIC | Age: 60
End: 2025-06-11

## 2025-06-11 PROCEDURE — 97110 THERAPEUTIC EXERCISES: CPT

## 2025-06-11 ASSESSMENT — PAIN SCALES - GENERAL
PAINLEVEL_OUTOF10: 6
PAINLEVEL_OUTOF10: 5

## 2025-06-11 NOTE — PROGRESS NOTES
proper body mechanics.  Progressed resistance, range, and repetitions as indicated.   Date:   6/11/25 Date:   6/5/25 Date:  6/9/25   Activity/Exercise      HEP education      Butterfly stretch       Modified rebeca stretch  5 x 15'' holds      Quad sets  Heel propped 5 sec holds x 20 ball behind knee for more cuing  Heel propped 5 sec holds x 20  Heel propped 5 sec holds x 20    Knee extension stretch  5 x 30 sec  5 x 30 sec  5 x 30 sec    Supine bridges       Hip flexor stretch       Hip extension       Hamstring curls       Hamstring stretch  W/ strap x 5 W/ strap x 5 W/ strap x 5   Hip adductor stretch  W/ strap x 5 W/ strap x 5 W/ strap x 5   Hip external rotation, log roll       Physioball hamstring curls in supine       NuStep  6 min lvl 1 for ROM      Gait mechanis Ambulated for 3 laps with walker x 160 ft and CGA Ambulated for one lap with walker x 160 ft and CGA  Ambulated for 2 lap with walker x 160 ft and CGA   Sit to stands From elevated table 2 x 10  From elevated table 2 x 10  From elevated table 2 x 10    SAQ / LAQ SAQ w/ bolster 2 x 10   LAQ 2 x 10  SAQ w/ bolster 2 x 10  SAQ w/ bolster 2 x 10    Hamstring curls         MANUAL THERAPY: (15 minutes):   Joint mobilization, Soft tissue mobilization, and Manipulation was utilized and necessary because of the patient's restricted joint motion, painful spasm, loss of articular motion, and restricted motion of soft tissue.   Manual Therapy Technique and Location Date:   6/3/25 Date:   6/5/25 Date:   6/9/25          STM/MFR to medial hamstring with active knee extension  STM/MFR to medial hamstring with active knee extension  STM/MFR to medial hamstring with active knee extension       MODALITIES:      Access Code: [email or web address]  URL: [email or web address]  Date: 03/21/2025  Prepared by: Eddie Roy    Exercises  - Supine Butterfly Groin Stretch  - 1 x daily - 7 x weekly - 3 sets - 10 reps  - Supine Bridge  - 1 x daily - 7 x weekly - 3 sets - 10

## 2025-06-11 NOTE — TELEPHONE ENCOUNTER
Please advise and help. Patient wants MRI. What is next step for patient? Do you want her to attend PT a little longer or do you want her to come in?

## 2025-06-11 NOTE — PROGRESS NOTES
Deborah Schuster  : 1965  Primary: [employer]age [employer] (Medicare Managed)  Secondary:  [facility] [place]  [address]  [facility] [address]  Phone: [phone]  Fax: [phone] Plan Frequency: 2 times a week    Plan of Care/Certification Expiration Date: 25        Plan of Care/Certification Expiration Date:  Plan of Care/Certification Expiration Date: 25    Frequency/Duration: Plan Frequency: 2 times a week      Time In/Out:   Time In: 1430  Time Out: 1530      PT Visit Info:    Progress Note Counter: 2      Visit Count:  18    OUTPATIENT PHYSICAL THERAPY:   Treatment Note 2025       Episode  (L hip pain)                 Treatment Diagnosis:    Pain in left hip  Stiffness of left hip, not elsewhere classified  Medical/Referring Diagnosis:    Chronic left hip pain [M25.552, G89.29]  Pain in left knee [M25.562]    Referring Physician:  Stefan Tai DO MD Orders:  PT Eval and Treat   Return MD Appt:  NA   Date of Onset:  Onset Date:  (Chronic: 3 months ago)     Allergies:   Patient has no known allergies.  Restrictions/Precautions:   None      Interventions Planned (Treatment may consist of any combination of the following):     See Assessment Note    Subjective Comments: Patient reports that she continues to have increased pain throughout her left knee.    Initial Pain Level:     5/10  Post Session Pain Level:      4/10  Medications Last Reviewed: 2025  Updated Objective Findings:   significant tightness throughout the posterior aspect of her L knee   Treatment   THERAPEUTIC EXERCISE: (45 minutes):    Exercises per grid below to improve mobility, strength, and balance.  Required maximal visual, verbal, manual, and tactile cues to promote proper body alignment, promote proper body posture, and promote proper body mechanics.  Progressed resistance, range, and repetitions as indicated.   Date:   6/3/25 Date:   25

## 2025-06-14 DIAGNOSIS — M25.552 CHRONIC LEFT HIP PAIN: ICD-10-CM

## 2025-06-14 DIAGNOSIS — G89.29 CHRONIC LEFT HIP PAIN: ICD-10-CM

## 2025-06-16 ENCOUNTER — TELEPHONE (OUTPATIENT)
Dept: FAMILY MEDICINE CLINIC | Facility: CLINIC | Age: 60
End: 2025-06-16

## 2025-06-16 DIAGNOSIS — M25.562 ACUTE PAIN OF LEFT KNEE: Primary | ICD-10-CM

## 2025-06-16 RX ORDER — NAPROXEN 500 MG/1
500 TABLET ORAL 2 TIMES DAILY WITH MEALS
Qty: 60 TABLET | Refills: 0 | OUTPATIENT
Start: 2025-06-16

## 2025-06-16 NOTE — TELEPHONE ENCOUNTER
Patient not tolerating physical therapy instilled and continued pain of her knee will get an MRI and have her return back for follow-up

## 2025-06-17 ENCOUNTER — HOSPITAL ENCOUNTER (OUTPATIENT)
Dept: PHYSICAL THERAPY | Age: 60
Setting detail: RECURRING SERIES
Discharge: HOME | End: 2025-06-20
Attending: FAMILY MEDICINE
Payer: MEDICARE

## 2025-06-17 PROCEDURE — 97110 THERAPEUTIC EXERCISES: CPT

## 2025-06-18 ASSESSMENT — PAIN SCALES - GENERAL: PAINLEVEL_OUTOF10: 3

## 2025-06-18 NOTE — PROGRESS NOTES
Deborah Schuster  : 1965  Primary: [employer]age Ppo (Medicare Managed)  Secondary:  [facility] [place]  [address]  [facility] [address]  Phone: [phone]  Fax: [phone] Plan Frequency: 2 times a week    Plan of Care/Certification Expiration Date: 25        Plan of Care/Certification Expiration Date:  Plan of Care/Certification Expiration Date: 25    Frequency/Duration: Plan Frequency: 2 times a week      Time In/Out:   Time In: 1430  Time Out: 1530      PT Visit Info:    Progress Note Counter: 4      Visit Count:  20    OUTPATIENT PHYSICAL THERAPY:   Treatment Note 2025       Episode  (L hip pain)                 Treatment Diagnosis:    Pain in left hip  Stiffness of left hip, not elsewhere classified  Medical/Referring Diagnosis:    Chronic left hip pain [M25.552, G89.29]  Pain in left knee [M25.562]    Referring Physician:  Stefan Tai DO MD Orders:  PT Eval and Treat   Return MD Appt:  NA   Date of Onset:  Onset Date:  (Chronic: 3 months ago)     Allergies:   Patient has no known allergies.  Restrictions/Precautions:   None      Interventions Planned (Treatment may consist of any combination of the following):     See Assessment Note    Subjective Comments: Patient and mother report that she continues to deal with the swelling in her left knee and state that they have scheduled a MRI that is happening next week.    Initial Pain Level:     3/10  Post Session Pain Level:      3/10  Medications Last Reviewed: 2025  Updated Objective Findings:  significant tightness throughout the posterior aspect of her L knee   Treatment   THERAPEUTIC EXERCISE: (60 minutes):    Exercises per grid below to improve mobility, strength, and balance.  Required maximal visual, verbal, manual, and tactile cues to promote proper body alignment, promote proper body posture, and promote proper body mechanics.  Progressed resistance,

## 2025-06-24 ENCOUNTER — HOSPITAL ENCOUNTER (OUTPATIENT)
Dept: PHYSICAL THERAPY | Age: 60
Setting detail: RECURRING SERIES
Discharge: HOME | End: 2025-06-27
Attending: FAMILY MEDICINE
Payer: MEDICARE

## 2025-06-24 PROCEDURE — 97110 THERAPEUTIC EXERCISES: CPT

## 2025-06-24 NOTE — PROGRESS NOTES
Deborah Schuster  : 1965  Primary: [employer]po (Medicare Managed)  Secondary:  [facility] [place]  [address]  [facility] [address]  Phone: [phone]  Fax: [phone] Plan Frequency: 2 times a week    Plan of Care/Certification Expiration Date: 25        Plan of Care/Certification Expiration Date:  Plan of Care/Certification Expiration Date: 25    Frequency/Duration: Plan Frequency: 2 times a week      Time In/Out:          PT Visit Info:    Progress Note Counter: 5      Visit Count:  21    OUTPATIENT PHYSICAL THERAPY:   Treatment Note 2025       Episode  (L hip pain)                 Treatment Diagnosis:    Pain in left hip  Stiffness of left hip, not elsewhere classified  Medical/Referring Diagnosis:    Chronic left hip pain [M25.552, G89.29]  Pain in left knee [M25.562]    Referring Physician:  Stefan Tai DO MD Orders:  PT Eval and Treat   Return MD Appt:  NA   Date of Onset:  Onset Date:  (Chronic: 3 months ago)     Allergies:   Patient has no known allergies.  Restrictions/Precautions:   None      Interventions Planned (Treatment may consist of any combination of the following):     See Assessment Note    Subjective Comments: Patient reports that she is doing good, still deals with occasional knee pain and feels like the swelling hasn't decreased at all.    Initial Pain Level:      /10  Post Session Pain Level:       /10  Medications Last Reviewed: 2025  Updated Objective Findings:  significant tightness throughout the posterior aspect of her L knee   Treatment   THERAPEUTIC EXERCISE: (60 minutes):    Exercises per grid below to improve mobility, strength, and balance.  Required maximal visual, verbal, manual, and tactile cues to promote proper body alignment, promote proper body posture, and promote proper body mechanics.  Progressed resistance, range, and repetitions as indicated.   Date:   25

## 2025-07-01 ENCOUNTER — TELEMEDICINE (OUTPATIENT)
Dept: FAMILY MEDICINE CLINIC | Facility: CLINIC | Age: 60
End: 2025-07-01
Payer: MEDICARE

## 2025-07-01 ENCOUNTER — APPOINTMENT (OUTPATIENT)
Dept: PHYSICAL THERAPY | Age: 60
End: 2025-07-01
Attending: FAMILY MEDICINE
Payer: MEDICARE

## 2025-07-01 DIAGNOSIS — M25.562 ACUTE PAIN OF LEFT KNEE: Primary | ICD-10-CM

## 2025-07-01 PROCEDURE — 99213 OFFICE O/P EST LOW 20 MIN: CPT | Performed by: FAMILY MEDICINE

## 2025-07-01 ASSESSMENT — ENCOUNTER SYMPTOMS
SHORTNESS OF BREATH: 0
NAUSEA: 0
VOMITING: 0

## 2025-07-01 NOTE — PROGRESS NOTES
PROGRESS NOTE    SUBJECTIVE:   Deborah Schuster is a 59 y.o. female seen for a follow up visit regarding the following chief complaint:     Chief Complaint   Patient presents with    Results           HPI patient is doing a phone call visit to go over her MRI of her knee after 4 visits of physical therapy and patient still has not finished therapy but she states she is getting betterDeborah Schuster is a 59 y.o. female evaluated via telephone on 7/1/2025 for Results  .        Total Time: minutes: 21-30 minutes  Total time 23 minutes  Deborah Schuster was evaluated through a synchronous (real-time) audio encounter. Patient identification was verified at the start of the visit. She (or guardian if applicable) is aware that this is a billable service, which includes applicable co-pays. This visit was conducted with the patient's (and/or legal guardian's) verbal consent. She has not had a related appointment within my department in the past 7 days or scheduled within the next 24 hours.   The patient was located at Home: [address] [place] [id number].  The provider was located at Facility (Appt Dept): [address] [place] [address] Dr Sanchez [address] [place] [id number].    Note: not billable if this call serves to triage the patient into an appointment for the relevant concern         Past Medical History, Past Surgical History, Family history, Social History, and Medications were all reviewed with the patient today and updated as necessary.       Current Outpatient Medications   Medication Sig Dispense Refill    naproxen (NAPROSYN) 500 MG tablet Take 1 tablet by mouth 2 times daily (with meals) 60 tablet 0    OLANZapine (ZYPREXA) 5 MG tablet Take 2 bid 120 tablet 5    OLANZapine (ZYPREXA) 5 MG tablet Take 1 tablet by mouth nightly 30 tablet 5    cetirizine (ZYRTEC) 10 MG tablet Take 1 tablet by mouth daily 30 tablet 0    fluticasone (FLONASE) 50 MCG/ACT nasal spray 2 sprays by Each Nostril route daily 16 g 0    rivastigmine

## 2025-07-03 ENCOUNTER — OFFICE VISIT (OUTPATIENT)
Dept: FAMILY MEDICINE CLINIC | Facility: CLINIC | Age: 60
End: 2025-07-03
Payer: MEDICARE

## 2025-07-03 ENCOUNTER — HOSPITAL ENCOUNTER (OUTPATIENT)
Dept: PHYSICAL THERAPY | Age: 60
Setting detail: RECURRING SERIES
Discharge: HOME | End: 2025-07-06
Attending: FAMILY MEDICINE
Payer: MEDICARE

## 2025-07-03 DIAGNOSIS — M25.562 LEFT KNEE PAIN, UNSPECIFIED CHRONICITY: Primary | ICD-10-CM

## 2025-07-03 DIAGNOSIS — M25.562 ACUTE PAIN OF LEFT KNEE: ICD-10-CM

## 2025-07-03 PROCEDURE — 20610 DRAIN/INJ JOINT/BURSA W/O US: CPT | Performed by: FAMILY MEDICINE

## 2025-07-03 PROCEDURE — 97110 THERAPEUTIC EXERCISES: CPT

## 2025-07-03 PROCEDURE — 99213 OFFICE O/P EST LOW 20 MIN: CPT | Performed by: FAMILY MEDICINE

## 2025-07-03 RX ORDER — NAPROXEN 500 MG/1
500 TABLET ORAL 2 TIMES DAILY WITH MEALS
Qty: 180 TABLET | Refills: 1 | OUTPATIENT
Start: 2025-07-03

## 2025-07-03 RX ORDER — TRIAMCINOLONE ACETONIDE 40 MG/ML
40 INJECTION, SUSPENSION INTRA-ARTICULAR; INTRAMUSCULAR ONCE
Status: COMPLETED | OUTPATIENT
Start: 2025-07-03 | End: 2025-07-03

## 2025-07-03 RX ADMIN — TRIAMCINOLONE ACETONIDE 40 MG: 40 INJECTION, SUSPENSION INTRA-ARTICULAR; INTRAMUSCULAR at 16:26

## 2025-07-03 ASSESSMENT — ENCOUNTER SYMPTOMS
SINUS PAIN: 0
DIARRHEA: 0
ABDOMINAL PAIN: 0
SHORTNESS OF BREATH: 0
COUGH: 0
RHINORRHEA: 0

## 2025-07-03 NOTE — PROGRESS NOTES
PROGRESS NOTE    SUBJECTIVE:   Deborah Schuster is a 59 y.o. female seen for a follow up visit regarding the following chief complaint:     Chief Complaint   Patient presents with    Injections     KNEE INJECTION           HPI patient presents office with continued left knee pain despite going to physical therapy and wants an injection in her knee patient walking around with a walker assistance      Past Medical History, Past Surgical History, Family history, Social History, and Medications were all reviewed with the patient today and updated as necessary.       Current Outpatient Medications   Medication Sig Dispense Refill    naproxen (NAPROSYN) 500 MG tablet Take 1 tablet by mouth 2 times daily (with meals) 60 tablet 0    OLANZapine (ZYPREXA) 5 MG tablet Take 2 bid 120 tablet 5    OLANZapine (ZYPREXA) 5 MG tablet Take 1 tablet by mouth nightly 30 tablet 5    cetirizine (ZYRTEC) 10 MG tablet Take 1 tablet by mouth daily 30 tablet 0    fluticasone (FLONASE) 50 MCG/ACT nasal spray 2 sprays by Each Nostril route daily 16 g 0    montelukast (SINGULAIR) 10 MG tablet Take 1 tablet by mouth daily 30 tablet 11    rivastigmine (EXELON) 1.5 MG capsule Take 1 capsule by mouth 2 times daily 10 capsule 3    atorvastatin (LIPITOR) 10 MG tablet Take 1 tablet by mouth daily 90 tablet 3    naproxen (NAPROSYN) 500 MG tablet Take 1 tablet by mouth 2 times daily (with meals) 60 tablet 3    memantine (NAMENDA) 10 MG tablet TAKE   1  TABLET  TWICE  DAILY 180 tablet 3    donepezil (ARICEPT) 10 MG tablet Take 1 tablet by mouth nightly 90 tablet 3    zaleplon (SONATA) 10 MG capsule       traZODone (DESYREL) 50 MG tablet Take 1 tablet by mouth 2 times daily 90 tablet 3    valsartan (DIOVAN) 160 MG tablet Take 1 tablet by mouth daily 90 tablet 3    busPIRone (BUSPAR) 30 MG tablet Take 30 mg by mouth 3 times daily      sertraline (ZOLOFT) 100 MG tablet TAKE 3 TABLETS BY MOUTH ONCE DAILY      clonazePAM (KLONOPIN) 1 MG tablet Take by mouth 2

## 2025-07-17 ENCOUNTER — APPOINTMENT (OUTPATIENT)
Dept: PHYSICAL THERAPY | Age: 60
End: 2025-07-17
Attending: FAMILY MEDICINE
Payer: MEDICARE

## 2025-07-21 ENCOUNTER — APPOINTMENT (OUTPATIENT)
Dept: PHYSICAL THERAPY | Age: 60
End: 2025-07-21
Attending: FAMILY MEDICINE
Payer: MEDICARE

## 2025-08-07 ENCOUNTER — CLINICAL DOCUMENTATION (OUTPATIENT)
Dept: NEUROLOGY | Age: 60
End: 2025-08-07

## 2025-08-07 RX ORDER — OLANZAPINE 15 MG/1
15 TABLET, FILM COATED ORAL 2 TIMES DAILY
Qty: 60 TABLET | Refills: 5 | Status: SHIPPED | OUTPATIENT
Start: 2025-08-07

## (undated) DEVICE — CANNULA NSL ORAL AD FOR CAPNOFLEX CO2 O2 AIRLFE

## (undated) DEVICE — SYR 3ML LL TIP 1/10ML GRAD --

## (undated) DEVICE — SYR 5ML 1/5 GRAD LL NSAF LF --

## (undated) DEVICE — CONNECTOR TBNG OD5-7MM O2 END DISP

## (undated) DEVICE — NDL PRT INJ NSAF BLNT 18GX1.5 --

## (undated) DEVICE — KENDALL RADIOLUCENT FOAM MONITORING ELECTRODE RECTANGULAR SHAPE: Brand: KENDALL